# Patient Record
Sex: FEMALE | Race: WHITE | Employment: PART TIME | URBAN - METROPOLITAN AREA
[De-identification: names, ages, dates, MRNs, and addresses within clinical notes are randomized per-mention and may not be internally consistent; named-entity substitution may affect disease eponyms.]

---

## 2019-01-07 PROCEDURE — 87624 HPV HI-RISK TYP POOLED RSLT: CPT | Performed by: OBSTETRICS & GYNECOLOGY

## 2019-01-07 PROCEDURE — 88175 CYTOPATH C/V AUTO FLUID REDO: CPT | Performed by: OBSTETRICS & GYNECOLOGY

## 2019-01-07 PROCEDURE — 87086 URINE CULTURE/COLONY COUNT: CPT | Performed by: OBSTETRICS & GYNECOLOGY

## 2019-01-15 NOTE — PROGRESS NOTES
Jerzy Reynoso Consultation    Dear Dr. Genaro Blancas,    Thank you for requesting ultrasound evaluation and maternal fetal medicine consultation on your patient Carlos Rosa.   As you are aware she is a 41year old female with a Sin (99.8 kg)   LMP 10/15/2018   BMI 35.51 kg/m²   General: alert and oriented,no acute distress  Abdomen: gravid, soft, non-tender  Extremities: non-tender, no edema      OBSTETRIC ULTRASOUND  The patient had a first trimester ultrasound today which I interpr 25 mm x 28 mm. Volume: 9.2 ml.  ____________________________________________________________________________  Comments:  Impression:  1. IUP at 13w2d  2. Normal appearing fetus  3. Normal transvaginal CL and normal uterus.   4.  Left ovary appears normal Women 28years of age or older are more likely to be delivered by .  The  delivery rate in the general obstetric population of the [facility] is almost 30 percent, compared to almost 48 percent in women over age 36 to 39 and almost [de-identified] per and with trisomy 21 is 1: 190.     Invasive Testing  I offered invasive genetic testing (amniocentesis, chorionic villus sampling) after reviewing the diagnostic accuracy of these tests as well as the procedure associated loss rate (1:500 for genetic amnioc association with obesity in the general population, type 2 diabetes mellitus is one of the two most common medical complications of the obese .  The increased risk of type 2 diabetes is primarily related to an exaggerated increase in insulin resistan of neural tube defects increased significantly with maternal weight. The analysis found that overweight and obese pregnant women experienced significantly more stillbirths than normal weight women.        We discussed the current recommendations for Arya Angel setting of  premature rupture of the membranes, 20 to 25 percent result from intraamniotic infection, and 25 to 30 percent are due to spontaneous (unexplained)  labor.                While the ability of obstetric care providers to identify wo risk of  labor. Cervical insufficiency complicates 0.1 to 2 percent of all pregnancies, and is estimated to be responsible for approximately 15 percent of late second trimester losses.                A woman with an uncomplicated p PPROM  · Advanced maternal age, declined aneuploidy screening or invasive genetic testing  · Maternal obesity complicating pregnancy    RECOMMENDATIONS:  · Continue care with Dr. Eddie Davis  · Begin weekly [place]/ 17-OHPC therapy at 16 weeks and continue until

## 2019-01-16 ENCOUNTER — OFFICE VISIT (OUTPATIENT)
Dept: PERINATAL CARE | Facility: HOSPITAL | Age: 39
End: 2019-01-16
Attending: OBSTETRICS & GYNECOLOGY
Payer: COMMERCIAL

## 2019-01-16 VITALS
SYSTOLIC BLOOD PRESSURE: 132 MMHG | BODY MASS INDEX: 35.36 KG/M2 | HEIGHT: 66 IN | HEART RATE: 97 BPM | WEIGHT: 220 LBS | DIASTOLIC BLOOD PRESSURE: 87 MMHG

## 2019-01-16 DIAGNOSIS — O09.521 MULTIGRAVIDA OF ADVANCED MATERNAL AGE IN FIRST TRIMESTER: ICD-10-CM

## 2019-01-16 DIAGNOSIS — Z98.891 H/O: C-SECTION: ICD-10-CM

## 2019-01-16 DIAGNOSIS — O99.211 OBESITY AFFECTING PREGNANCY IN FIRST TRIMESTER: ICD-10-CM

## 2019-01-16 DIAGNOSIS — O09.891 H/O PRETERM DELIVERY, CURRENTLY PREGNANT, FIRST TRIMESTER: ICD-10-CM

## 2019-01-16 PROCEDURE — 99244 OFF/OP CNSLTJ NEW/EST MOD 40: CPT | Performed by: OBSTETRICS & GYNECOLOGY

## 2019-01-16 PROCEDURE — 76801 OB US < 14 WKS SINGLE FETUS: CPT | Performed by: OBSTETRICS & GYNECOLOGY

## 2019-01-16 PROCEDURE — 76817 TRANSVAGINAL US OBSTETRIC: CPT | Performed by: OBSTETRICS & GYNECOLOGY

## 2019-01-16 RX ORDER — CHOLECALCIFEROL (VITAMIN D3) 25 MCG
1 TABLET,CHEWABLE ORAL DAILY
COMMUNITY

## 2019-01-30 NOTE — PROGRESS NOTES
Kayy Fuller  Dear Dr. Kulwinder Huggins,     Thank you for requesting ultrasound evaluation and maternal fetal medicine consultation on your patient Michelle Gardner.   As you are aware she is a 41year old female  with Structures:  Cervix: normal. No funneling. Cervical length: 33.0 mm  Cervical length with fundal pressure: 33 mm.  ____________________________________________________________________________  Comments:  Impression:  1. IUP at 16w2d  2.   Normal cervical with numerous maternal and  risks which were reviewed. See prior [facility] note from 18 for details.     H/o  DELIVERY -   She had SROM at ~ 24 weeks and emergent  at 25+ weeks due to cord prolapse.   Her daughter, Corrie López, is doing we contact me if additional questions or concerns arise.       Cyndi Villatoro M.D.     The majority of the time (>50%) was spent in review of records, consultation and coordination of care. Our discussion is summarized above.  The approximate physician face-to-f

## 2019-02-06 ENCOUNTER — OFFICE VISIT (OUTPATIENT)
Dept: PERINATAL CARE | Facility: HOSPITAL | Age: 39
End: 2019-02-06
Attending: OBSTETRICS & GYNECOLOGY
Payer: COMMERCIAL

## 2019-02-06 VITALS
BODY MASS INDEX: 36 KG/M2 | DIASTOLIC BLOOD PRESSURE: 86 MMHG | SYSTOLIC BLOOD PRESSURE: 128 MMHG | HEART RATE: 102 BPM | WEIGHT: 224 LBS | HEIGHT: 66 IN

## 2019-02-06 DIAGNOSIS — R10.31 RLQ ABDOMINAL PAIN: ICD-10-CM

## 2019-02-06 DIAGNOSIS — O09.892 H/O PRETERM DELIVERY, CURRENTLY PREGNANT, SECOND TRIMESTER: ICD-10-CM

## 2019-02-06 DIAGNOSIS — O09.522 MULTIGRAVIDA OF ADVANCED MATERNAL AGE IN SECOND TRIMESTER: ICD-10-CM

## 2019-02-06 DIAGNOSIS — O99.212 OBESITY AFFECTING PREGNANCY IN SECOND TRIMESTER: ICD-10-CM

## 2019-02-06 DIAGNOSIS — Z87.74 HISTORY OF CONGENITAL HEART DEFECT: ICD-10-CM

## 2019-02-06 DIAGNOSIS — O09.891 H/O PRETERM DELIVERY, CURRENTLY PREGNANT, FIRST TRIMESTER: ICD-10-CM

## 2019-02-06 DIAGNOSIS — Z98.891 H/O: C-SECTION: ICD-10-CM

## 2019-02-06 PROCEDURE — 99214 OFFICE O/P EST MOD 30 MIN: CPT | Performed by: OBSTETRICS & GYNECOLOGY

## 2019-02-06 PROCEDURE — 76817 TRANSVAGINAL US OBSTETRIC: CPT | Performed by: OBSTETRICS & GYNECOLOGY

## 2019-02-06 PROCEDURE — 76815 OB US LIMITED FETUS(S): CPT | Performed by: OBSTETRICS & GYNECOLOGY

## 2019-02-06 NOTE — PROGRESS NOTES
Pt here for baseline cx length accompanied by her   States has had brown spotting when wipe.  OB aware

## 2019-02-20 ENCOUNTER — OFFICE VISIT (OUTPATIENT)
Dept: PERINATAL CARE | Facility: HOSPITAL | Age: 39
End: 2019-02-20
Attending: OBSTETRICS & GYNECOLOGY
Payer: COMMERCIAL

## 2019-02-20 VITALS
HEART RATE: 92 BPM | WEIGHT: 225 LBS | HEIGHT: 66 IN | SYSTOLIC BLOOD PRESSURE: 129 MMHG | BODY MASS INDEX: 36.16 KG/M2 | DIASTOLIC BLOOD PRESSURE: 84 MMHG

## 2019-02-20 DIAGNOSIS — O24.419 GESTATIONAL DIABETES MELLITUS (GDM) IN SECOND TRIMESTER, GESTATIONAL DIABETES METHOD OF CONTROL UNSPECIFIED: ICD-10-CM

## 2019-02-20 DIAGNOSIS — O09.891 H/O PRETERM DELIVERY, CURRENTLY PREGNANT, FIRST TRIMESTER: ICD-10-CM

## 2019-02-20 PROCEDURE — 99214 OFFICE O/P EST MOD 30 MIN: CPT | Performed by: OBSTETRICS & GYNECOLOGY

## 2019-02-20 PROCEDURE — 76817 TRANSVAGINAL US OBSTETRIC: CPT | Performed by: OBSTETRICS & GYNECOLOGY

## 2019-02-20 NOTE — PROGRESS NOTES
Indication: obesity. Maternal age (41 years). ____________________________________________________________________________  History: Age: 41 years. Maternal age at [facility]: 41 years. : 2 Para: 1.  Last menstrual period: 10/15/2018.  _________________ AGE    Background  I reviewed with the patient that pregnancies in women of advanced maternal age (28 or older at delivery) are associated with elevated risks.  Specifically, there is a higher rate of:  · Fetal malformations  · Preeclampsia  · Gestational d syndrome ranging from 2 to 13%. We discussed the recommended plan of care based on her  risk factors. Faisal Blackmon and her significant other, Miryam Monte, had their questions answered to their satisfaction.       IMPRESSION:  · IUP at 18w2d  · [facility]

## 2019-03-06 NOTE — PROGRESS NOTES
Vinicio Griffin  Dear Dr. Jone Gauthier,     Thank you for requesting ultrasound evaluation and maternal fetal medicine consultation on your patient Cecily Blas.  As you are aware she is a 41year old female  with 1.  ____________________________________________________________________________  Dating:  LMP: 10/15/2018 EDC: 07/22/2019 GA by LMP: 21w2d  Current Scan on: 03/13/2019 EDC: 07/25/2019 GA by current scan: 20w6d  The calculation of the gestational age by cu and chromosomal abnormalities. ____________________________________________________________________________  Genetic Sonogram:  measured FL: 35.5 mm exp. FL: 34.1 mm  measured HUM: 33.5 mm exp. HUM: 32.9 mm  ratio FL/exp. FL: 1.04  ratio HUM/exp. HUM: 1.0 reduce recurrent PTD were reviewed.   She is starting on Leon as soon as her insurance approves it.     Congenital Heart Defects:  Congenital heart defects occur in approximately 8 per 1000 livebirths. Marimarmarcos Lockwood may occur as one component of a recognized gene recommended four times daily assessments (fasting and two-hour post-prandial) is excellent. Her overall glucose control is good except for mildly elevated FBA.     The patient is presently on diet therapy; her compliance with her diabetic diet regimen was classical  · H/o PPROM  · Advanced maternal age, declined aneuploidy screening or invasive genetic testing  · Maternal obesity complicating pregnancy  · Maternal h/o ASD/ VSD  · Gestational diabetes, A1     RECOMMENDATIONS:  · Continue care with Dr. Shea

## 2019-03-13 ENCOUNTER — OFFICE VISIT (OUTPATIENT)
Dept: PERINATAL CARE | Facility: HOSPITAL | Age: 39
End: 2019-03-13
Attending: OBSTETRICS & GYNECOLOGY
Payer: COMMERCIAL

## 2019-03-13 VITALS
BODY MASS INDEX: 36.16 KG/M2 | HEIGHT: 66 IN | HEART RATE: 94 BPM | DIASTOLIC BLOOD PRESSURE: 84 MMHG | WEIGHT: 225 LBS | SYSTOLIC BLOOD PRESSURE: 126 MMHG

## 2019-03-13 DIAGNOSIS — O09.891 H/O PRETERM DELIVERY, CURRENTLY PREGNANT, FIRST TRIMESTER: ICD-10-CM

## 2019-03-13 DIAGNOSIS — O99.212 OBESITY AFFECTING PREGNANCY IN SECOND TRIMESTER: ICD-10-CM

## 2019-03-13 DIAGNOSIS — Z87.74: ICD-10-CM

## 2019-03-13 DIAGNOSIS — O24.410 DIET CONTROLLED GESTATIONAL DIABETES MELLITUS (GDM) IN SECOND TRIMESTER: ICD-10-CM

## 2019-03-13 PROBLEM — O24.419 GESTATIONAL DIABETES: Status: ACTIVE | Noted: 2019-03-13

## 2019-03-13 PROBLEM — O99.210 OBESITY COMPLICATING PREGNANCY: Status: ACTIVE | Noted: 2019-03-13

## 2019-03-13 PROCEDURE — 99215 OFFICE O/P EST HI 40 MIN: CPT | Performed by: OBSTETRICS & GYNECOLOGY

## 2019-03-13 PROCEDURE — 76817 TRANSVAGINAL US OBSTETRIC: CPT | Performed by: OBSTETRICS & GYNECOLOGY

## 2019-03-13 PROCEDURE — 76811 OB US DETAILED SNGL FETUS: CPT | Performed by: OBSTETRICS & GYNECOLOGY

## 2019-03-29 NOTE — PROGRESS NOTES
Outpatient Maternal-Fetal Medicine Follow-Up     Dear Dr. Shea,     Thank you for requesting ultrasound evaluation and maternal fetal medicine consultation on your patient Stacia Rosa.  As you are aware she is a 41year old female  with 10/15/2018.  ____________________________________________________________________________  Echocardiography:    Image quality:  Limited  Situs:  Normal  Position of stomach:  Left sided  Heart size:  Normal  Position of apex:  normal  Systemic veins:  Norm pregnancy. We discussed risks for fetal growth abnormalities, hypertensive complications, maternal hospitalizations,  mortality and  delivery. We discussed the plan of care and the rationale for the increased surveillance.   We reviewed t later in life. The importance of good glycemic control and avoidance of prolonged hypoglycemia and hyperglycemia was addressed.      GESTATIONAL DIABETES - follow-up  ADA diet recommendations were reviewed and the patient's dietary compliance is good.   Her participate in the care of your patient. Meenakshi Murillo do not hesitate to contact me if additional questions or concerns arise.       Laila Quintanilla M.D.     The majority of the time (>50%) was spent in review of records, consultation and coordination of care. Jolynn Barnes

## 2019-04-03 ENCOUNTER — OFFICE VISIT (OUTPATIENT)
Dept: PERINATAL CARE | Facility: HOSPITAL | Age: 39
End: 2019-04-03
Attending: OBSTETRICS & GYNECOLOGY
Payer: COMMERCIAL

## 2019-04-03 VITALS
HEART RATE: 97 BPM | SYSTOLIC BLOOD PRESSURE: 126 MMHG | DIASTOLIC BLOOD PRESSURE: 83 MMHG | WEIGHT: 228 LBS | BODY MASS INDEX: 37 KG/M2

## 2019-04-03 DIAGNOSIS — O09.891 H/O PRETERM DELIVERY, CURRENTLY PREGNANT, FIRST TRIMESTER: ICD-10-CM

## 2019-04-03 DIAGNOSIS — O99.213 OBESITY AFFECTING PREGNANCY IN THIRD TRIMESTER: ICD-10-CM

## 2019-04-03 DIAGNOSIS — O24.414 INSULIN CONTROLLED GESTATIONAL DIABETES MELLITUS (GDM) IN THIRD TRIMESTER: ICD-10-CM

## 2019-04-03 DIAGNOSIS — Z87.74: ICD-10-CM

## 2019-04-03 PROCEDURE — 76827 ECHO EXAM OF FETAL HEART: CPT | Performed by: OBSTETRICS & GYNECOLOGY

## 2019-04-03 PROCEDURE — 99215 OFFICE O/P EST HI 40 MIN: CPT | Performed by: OBSTETRICS & GYNECOLOGY

## 2019-04-03 PROCEDURE — 93325 DOPPLER ECHO COLOR FLOW MAPG: CPT | Performed by: OBSTETRICS & GYNECOLOGY

## 2019-04-03 PROCEDURE — 76825 ECHO EXAM OF FETAL HEART: CPT | Performed by: OBSTETRICS & GYNECOLOGY

## 2019-04-24 NOTE — PROGRESS NOTES
Outpatient Maternal-Fetal Medicine Follow-Up     Dear Dr. Shea,     Thank you for requesting ultrasound evaluation and maternal fetal medicine consultation on your patient Stacia Rosa.  As you are aware she is a 41year old female  with gestation.   Biometry:  BPD 69.3 mm 6th% 27w6d (27w1d to 28w4d)  .7 mm <5th% 27w2d (25w2d to 29w3d)  .3 mm 26th% 28w5d (28w0d to 29w3d)  FL 53.5 mm 14th% 28w3d (26w2d to 30w3d)  OFD 89.7 mm <5th% 27w1d  TCD 31.1 mm 18th% 27w6d  HUM 49.2 mm 35th% during pregnancy is associated with numerous maternal and  risks which were reviewed.  See prior MFM note from 18 for details.     H/o  DELIVERY -   She had SROM at ~ 24 weeks and emergent  at 25+ weeks due to cord prolapse.    recommended plan of care based on her  risk factors.  Stacia and her significant other, Jonathan Dover, had their questions answered to their satisfaction.        IMPRESSION:  · IUP at 29w2d  · Normal fetal growth and anatomy  · H/o , probable cl

## 2019-04-30 PROCEDURE — 87389 HIV-1 AG W/HIV-1&-2 AB AG IA: CPT | Performed by: OBSTETRICS & GYNECOLOGY

## 2019-05-08 ENCOUNTER — OFFICE VISIT (OUTPATIENT)
Dept: PERINATAL CARE | Facility: HOSPITAL | Age: 39
End: 2019-05-08
Attending: OBSTETRICS & GYNECOLOGY
Payer: COMMERCIAL

## 2019-05-08 VITALS
HEART RATE: 86 BPM | BODY MASS INDEX: 36 KG/M2 | WEIGHT: 226 LBS | SYSTOLIC BLOOD PRESSURE: 123 MMHG | DIASTOLIC BLOOD PRESSURE: 79 MMHG

## 2019-05-08 DIAGNOSIS — O99.213 OBESITY AFFECTING PREGNANCY IN THIRD TRIMESTER: ICD-10-CM

## 2019-05-08 DIAGNOSIS — O24.414 INSULIN CONTROLLED GESTATIONAL DIABETES MELLITUS (GDM) IN THIRD TRIMESTER: ICD-10-CM

## 2019-05-08 DIAGNOSIS — O09.891 H/O PRETERM DELIVERY, CURRENTLY PREGNANT, FIRST TRIMESTER: ICD-10-CM

## 2019-05-08 DIAGNOSIS — Z87.74: ICD-10-CM

## 2019-05-08 PROCEDURE — 99215 OFFICE O/P EST HI 40 MIN: CPT | Performed by: OBSTETRICS & GYNECOLOGY

## 2019-05-08 PROCEDURE — 76816 OB US FOLLOW-UP PER FETUS: CPT | Performed by: OBSTETRICS & GYNECOLOGY

## 2019-05-08 NOTE — PROGRESS NOTES
Pt here for growth ultrasound  Pt states +FM  No complaints  17 P given per pt request. Rosa Elena Michelle no complaints

## 2019-05-29 NOTE — PROGRESS NOTES
Outpatient Maternal-Fetal Medicine Follow-Up     Dear Dr. Shea,     Thank you for requesting ultrasound evaluation and maternal fetal medicine consultation on your patient Stacia Rosa.  As you are aware she is a 41year old female  with gestation.   Biometry:  BPD 79.9 mm 14th% 32w1d (31w0d to 33w1d)  .3 mm <5th% 32w3d (29w3d to 35w2d)  .5 mm 82nd% 34w3d (33w3d to 35w3d)  FL 62.4 mm 16th% 32w2d (29w2d to 35w2d)  .5 mm 18th% 31w3d  HUM 54.2 mm 22nd% 31w4d  VENTRp 3.9 mm n discussed the plan of care and the rationale for the increased surveillance.  We reviewed the signs and symptoms of preeclampsia,  labor and monitoring fetal movement.  We discussed reasons for her to call her physician.     OBESITY:  BMI [address] is:  Levemir 0 units qAM, 20 units qHS     Medical Regimen Recommendation:  Levemir 0 units qAM, 20 units qHS     Continued medication use and capillary blood glucose assessments were reviewed as well as recommendations for serial growth ultrasounds and an

## 2019-06-05 ENCOUNTER — OFFICE VISIT (OUTPATIENT)
Dept: PERINATAL CARE | Facility: HOSPITAL | Age: 39
End: 2019-06-05
Attending: OBSTETRICS & GYNECOLOGY
Payer: COMMERCIAL

## 2019-06-05 VITALS
WEIGHT: 229 LBS | SYSTOLIC BLOOD PRESSURE: 122 MMHG | HEART RATE: 86 BPM | BODY MASS INDEX: 37 KG/M2 | DIASTOLIC BLOOD PRESSURE: 80 MMHG

## 2019-06-05 DIAGNOSIS — O99.213 OBESITY AFFECTING PREGNANCY IN THIRD TRIMESTER: ICD-10-CM

## 2019-06-05 DIAGNOSIS — O09.891 H/O PRETERM DELIVERY, CURRENTLY PREGNANT, FIRST TRIMESTER: ICD-10-CM

## 2019-06-05 DIAGNOSIS — O24.414 INSULIN CONTROLLED GESTATIONAL DIABETES MELLITUS (GDM) IN THIRD TRIMESTER: ICD-10-CM

## 2019-06-05 PROCEDURE — 76816 OB US FOLLOW-UP PER FETUS: CPT | Performed by: OBSTETRICS & GYNECOLOGY

## 2019-06-05 PROCEDURE — 99214 OFFICE O/P EST MOD 30 MIN: CPT | Performed by: OBSTETRICS & GYNECOLOGY

## 2019-06-05 PROCEDURE — 76819 FETAL BIOPHYS PROFIL W/O NST: CPT | Performed by: OBSTETRICS & GYNECOLOGY

## 2019-06-24 NOTE — PROGRESS NOTES
Outpatient Maternal-Fetal Medicine Follow-Up     Dear Dr. Shea,     Thank you for requesting ultrasound evaluation and maternal fetal medicine consultation on your patient Stacia Rosa.  As you are aware she is a 41year old female  with gestation.   Biometry:  BPD 83.7 mm <5th% 33w5d (32w5d to 34w5d)  .6 mm 7th% 35w2d (32w2d to 38w2d)  .1 mm 60th% 36w2d (35w1d to 37w2d)  FL 68.2 mm 17th% 35w0d (32w1d to 38w0d)  .0 mm 33rd% 34w5d  HUM 58.2 mm 16th% 34w1d  VENTRp 7.4 mm n/ hypertensive complications, maternal hospitalizations,  mortality and  delivery.  We discussed the plan of care and the rationale for the increased surveillance.  We reviewed the signs and symptoms of preeclampsia,  labor and monito regimen was reviewed and it is excellent.    Her current insulin regimen is:  Levemir 0 units qAM, 20 units qHS     Medical Regimen Recommendation: No change  Levemir 0 units qAM, 20 units qHS     Continued medication use and capillary blood glucose assessm

## 2019-06-26 ENCOUNTER — OFFICE VISIT (OUTPATIENT)
Dept: PERINATAL CARE | Facility: HOSPITAL | Age: 39
End: 2019-06-26
Attending: OBSTETRICS & GYNECOLOGY
Payer: COMMERCIAL

## 2019-06-26 VITALS
SYSTOLIC BLOOD PRESSURE: 121 MMHG | DIASTOLIC BLOOD PRESSURE: 83 MMHG | HEART RATE: 96 BPM | WEIGHT: 230 LBS | BODY MASS INDEX: 37 KG/M2

## 2019-06-26 DIAGNOSIS — O24.414 INSULIN CONTROLLED GESTATIONAL DIABETES MELLITUS IN THIRD TRIMESTER: ICD-10-CM

## 2019-06-26 DIAGNOSIS — Z87.74: ICD-10-CM

## 2019-06-26 DIAGNOSIS — O99.213 OBESITY AFFECTING PREGNANCY IN THIRD TRIMESTER: ICD-10-CM

## 2019-06-26 DIAGNOSIS — O09.891 H/O PRETERM DELIVERY, CURRENTLY PREGNANT, FIRST TRIMESTER: ICD-10-CM

## 2019-06-26 PROCEDURE — 76816 OB US FOLLOW-UP PER FETUS: CPT | Performed by: OBSTETRICS & GYNECOLOGY

## 2019-06-26 PROCEDURE — 76819 FETAL BIOPHYS PROFIL W/O NST: CPT | Performed by: OBSTETRICS & GYNECOLOGY

## 2019-06-26 PROCEDURE — 99213 OFFICE O/P EST LOW 20 MIN: CPT | Performed by: OBSTETRICS & GYNECOLOGY

## 2019-07-02 ENCOUNTER — TELEPHONE (OUTPATIENT)
Dept: OBGYN UNIT | Facility: HOSPITAL | Age: 39
End: 2019-07-02

## 2019-07-05 ENCOUNTER — TELEPHONE (OUTPATIENT)
Dept: OBGYN UNIT | Facility: HOSPITAL | Age: 39
End: 2019-07-05

## 2019-07-08 ENCOUNTER — HOSPITAL ENCOUNTER (INPATIENT)
Facility: HOSPITAL | Age: 39
LOS: 3 days | Discharge: HOME | End: 2019-07-11
Attending: OBSTETRICS & GYNECOLOGY | Admitting: OBSTETRICS & GYNECOLOGY
Payer: COMMERCIAL

## 2019-07-08 ENCOUNTER — ANESTHESIA EVENT (OUTPATIENT)
Dept: OBGYN UNIT | Facility: HOSPITAL | Age: 39
End: 2019-07-08

## 2019-07-08 ENCOUNTER — ANESTHESIA (OUTPATIENT)
Dept: OBGYN UNIT | Facility: HOSPITAL | Age: 39
End: 2019-07-08

## 2019-07-08 PROBLEM — Z34.90 PREGNANCY: Status: ACTIVE | Noted: 2019-07-08

## 2019-07-08 LAB
ANTIBODY SCREEN: NEGATIVE
BASOPHILS # BLD AUTO: 0.03 X10(3) UL (ref 0–0.2)
BASOPHILS NFR BLD AUTO: 0.3 %
DEPRECATED RDW RBC AUTO: 48 FL (ref 35.1–46.3)
EOSINOPHIL # BLD AUTO: 0.12 X10(3) UL (ref 0–0.7)
EOSINOPHIL NFR BLD AUTO: 1.1 %
ERYTHROCYTE [DISTWIDTH] IN BLOOD BY AUTOMATED COUNT: 15.8 % (ref 11–15)
GLUCOSE BLD-MCNC: 79 MG/DL (ref 70–99)
GLUCOSE BLD-MCNC: 87 MG/DL (ref 70–99)
HCT VFR BLD AUTO: 34.5 % (ref 35–48)
HGB BLD-MCNC: 11 G/DL (ref 12–16)
IMM GRANULOCYTES # BLD AUTO: 0.04 X10(3) UL (ref 0–1)
IMM GRANULOCYTES NFR BLD: 0.4 %
LYMPHOCYTES # BLD AUTO: 2.67 X10(3) UL (ref 1–4)
LYMPHOCYTES NFR BLD AUTO: 24 %
MCH RBC QN AUTO: 27 PG (ref 26–34)
MCHC RBC AUTO-ENTMCNC: 31.9 G/DL (ref 31–37)
MCV RBC AUTO: 84.6 FL (ref 80–100)
MONOCYTES # BLD AUTO: 0.67 X10(3) UL (ref 0.1–1)
MONOCYTES NFR BLD AUTO: 6 %
NEUTROPHILS # BLD AUTO: 7.61 X10 (3) UL (ref 1.5–7.7)
NEUTROPHILS # BLD AUTO: 7.61 X10(3) UL (ref 1.5–7.7)
NEUTROPHILS NFR BLD AUTO: 68.2 %
PLATELET # BLD AUTO: 315 10(3)UL (ref 150–450)
RBC # BLD AUTO: 4.08 X10(6)UL (ref 3.8–5.3)
RH BLOOD TYPE: POSITIVE
T PALLIDUM AB SER QL IA: NONREACTIVE
WBC # BLD AUTO: 11.1 X10(3) UL (ref 4–11)

## 2019-07-08 PROCEDURE — 86850 RBC ANTIBODY SCREEN: CPT | Performed by: OBSTETRICS & GYNECOLOGY

## 2019-07-08 PROCEDURE — 0UB00ZX EXCISION OF RIGHT OVARY, OPEN APPROACH, DIAGNOSTIC: ICD-10-PCS | Performed by: OBSTETRICS & GYNECOLOGY

## 2019-07-08 PROCEDURE — 86900 BLOOD TYPING SEROLOGIC ABO: CPT | Performed by: OBSTETRICS & GYNECOLOGY

## 2019-07-08 PROCEDURE — 85025 COMPLETE CBC W/AUTO DIFF WBC: CPT | Performed by: OBSTETRICS & GYNECOLOGY

## 2019-07-08 PROCEDURE — 88305 TISSUE EXAM BY PATHOLOGIST: CPT | Performed by: OBSTETRICS & GYNECOLOGY

## 2019-07-08 PROCEDURE — 86780 TREPONEMA PALLIDUM: CPT | Performed by: OBSTETRICS & GYNECOLOGY

## 2019-07-08 PROCEDURE — 0JB80ZZ EXCISION OF ABDOMEN SUBCUTANEOUS TISSUE AND FASCIA, OPEN APPROACH: ICD-10-PCS | Performed by: OBSTETRICS & GYNECOLOGY

## 2019-07-08 PROCEDURE — 86901 BLOOD TYPING SEROLOGIC RH(D): CPT | Performed by: OBSTETRICS & GYNECOLOGY

## 2019-07-08 PROCEDURE — 82962 GLUCOSE BLOOD TEST: CPT

## 2019-07-08 RX ORDER — TRISODIUM CITRATE DIHYDRATE AND CITRIC ACID MONOHYDRATE 500; 334 MG/5ML; MG/5ML
30 SOLUTION ORAL ONCE
Status: COMPLETED | OUTPATIENT
Start: 2019-07-08 | End: 2019-07-08

## 2019-07-08 RX ORDER — SODIUM CHLORIDE, SODIUM LACTATE, POTASSIUM CHLORIDE, CALCIUM CHLORIDE 600; 310; 30; 20 MG/100ML; MG/100ML; MG/100ML; MG/100ML
INJECTION, SOLUTION INTRAVENOUS CONTINUOUS
Status: DISCONTINUED | OUTPATIENT
Start: 2019-07-08 | End: 2019-07-08

## 2019-07-08 RX ORDER — BISACODYL 10 MG
10 SUPPOSITORY, RECTAL RECTAL
Status: DISCONTINUED | OUTPATIENT
Start: 2019-07-08 | End: 2019-07-11

## 2019-07-08 RX ORDER — DIPHENHYDRAMINE HCL 25 MG
25 CAPSULE ORAL EVERY 4 HOURS PRN
Status: DISCONTINUED | OUTPATIENT
Start: 2019-07-08 | End: 2019-07-11

## 2019-07-08 RX ORDER — ONDANSETRON 2 MG/ML
4 INJECTION INTRAMUSCULAR; INTRAVENOUS EVERY 6 HOURS PRN
Status: DISCONTINUED | OUTPATIENT
Start: 2019-07-08 | End: 2019-07-11

## 2019-07-08 RX ORDER — SODIUM CHLORIDE, SODIUM LACTATE, POTASSIUM CHLORIDE, CALCIUM CHLORIDE 600; 310; 30; 20 MG/100ML; MG/100ML; MG/100ML; MG/100ML
INJECTION, SOLUTION INTRAVENOUS CONTINUOUS
Status: DISCONTINUED | OUTPATIENT
Start: 2019-07-08 | End: 2019-07-08 | Stop reason: HOSPADM

## 2019-07-08 RX ORDER — DEXTROSE MONOHYDRATE 25 G/50ML
50 INJECTION, SOLUTION INTRAVENOUS
Status: DISCONTINUED | OUTPATIENT
Start: 2019-07-08 | End: 2019-07-08

## 2019-07-08 RX ORDER — MORPHINE SULFATE 0.5 MG/ML
0.2 INJECTION, SOLUTION EPIDURAL; INTRATHECAL; INTRAVENOUS ONCE
Status: DISCONTINUED | OUTPATIENT
Start: 2019-07-08 | End: 2019-07-08

## 2019-07-08 RX ORDER — DEXTROSE, SODIUM CHLORIDE, SODIUM LACTATE, POTASSIUM CHLORIDE, AND CALCIUM CHLORIDE 5; .6; .31; .03; .02 G/100ML; G/100ML; G/100ML; G/100ML; G/100ML
INJECTION, SOLUTION INTRAVENOUS CONTINUOUS
Status: DISCONTINUED | OUTPATIENT
Start: 2019-07-08 | End: 2019-07-11

## 2019-07-08 RX ORDER — SIMETHICONE 80 MG
80 TABLET,CHEWABLE ORAL 3 TIMES DAILY PRN
Status: DISCONTINUED | OUTPATIENT
Start: 2019-07-08 | End: 2019-07-11

## 2019-07-08 RX ORDER — IBUPROFEN 600 MG/1
600 TABLET ORAL EVERY 6 HOURS SCHEDULED
Status: DISCONTINUED | OUTPATIENT
Start: 2019-07-09 | End: 2019-07-11

## 2019-07-08 RX ORDER — DIPHENHYDRAMINE HYDROCHLORIDE 50 MG/ML
12.5 INJECTION INTRAMUSCULAR; INTRAVENOUS EVERY 4 HOURS PRN
Status: DISCONTINUED | OUTPATIENT
Start: 2019-07-08 | End: 2019-07-11

## 2019-07-08 RX ORDER — CEFAZOLIN SODIUM/WATER 2 G/20 ML
2 SYRINGE (ML) INTRAVENOUS ONCE
Status: COMPLETED | OUTPATIENT
Start: 2019-07-08 | End: 2019-07-08

## 2019-07-08 RX ORDER — CHOLECALCIFEROL (VITAMIN D3) 25 MCG
1 TABLET,CHEWABLE ORAL DAILY
Status: DISCONTINUED | OUTPATIENT
Start: 2019-07-09 | End: 2019-07-11

## 2019-07-08 RX ORDER — KETOROLAC TROMETHAMINE 30 MG/ML
30 INJECTION, SOLUTION INTRAMUSCULAR; INTRAVENOUS EVERY 6 HOURS PRN
Status: DISPENSED | OUTPATIENT
Start: 2019-07-08 | End: 2019-07-09

## 2019-07-08 RX ORDER — HYDROCODONE BITARTRATE AND ACETAMINOPHEN 5; 325 MG/1; MG/1
1 TABLET ORAL EVERY 4 HOURS PRN
Status: DISCONTINUED | OUTPATIENT
Start: 2019-07-08 | End: 2019-07-11

## 2019-07-08 RX ORDER — ZOLPIDEM TARTRATE 5 MG/1
5 TABLET ORAL NIGHTLY PRN
Status: DISCONTINUED | OUTPATIENT
Start: 2019-07-08 | End: 2019-07-11

## 2019-07-08 RX ORDER — ONDANSETRON 2 MG/ML
4 INJECTION INTRAMUSCULAR; INTRAVENOUS ONCE AS NEEDED
Status: DISCONTINUED | OUTPATIENT
Start: 2019-07-08 | End: 2019-07-08

## 2019-07-08 RX ORDER — HYDROCODONE BITARTRATE AND ACETAMINOPHEN 10; 325 MG/1; MG/1
1 TABLET ORAL EVERY 4 HOURS PRN
Status: DISCONTINUED | OUTPATIENT
Start: 2019-07-08 | End: 2019-07-11

## 2019-07-08 RX ORDER — DIPHENHYDRAMINE HYDROCHLORIDE 50 MG/ML
25 INJECTION INTRAMUSCULAR; INTRAVENOUS ONCE AS NEEDED
Status: DISCONTINUED | OUTPATIENT
Start: 2019-07-08 | End: 2019-07-08

## 2019-07-08 RX ORDER — MORPHINE SULFATE 4 MG/ML
2 INJECTION, SOLUTION INTRAMUSCULAR; INTRAVENOUS EVERY 2 HOUR PRN
Status: ACTIVE | OUTPATIENT
Start: 2019-07-08 | End: 2019-07-09

## 2019-07-08 RX ORDER — TRISODIUM CITRATE DIHYDRATE AND CITRIC ACID MONOHYDRATE 500; 334 MG/5ML; MG/5ML
30 SOLUTION ORAL ONCE
Status: DISCONTINUED | OUTPATIENT
Start: 2019-07-08 | End: 2019-07-08 | Stop reason: HOSPADM

## 2019-07-08 RX ORDER — KETOROLAC TROMETHAMINE 30 MG/ML
INJECTION, SOLUTION INTRAMUSCULAR; INTRAVENOUS
Status: DISPENSED
Start: 2019-07-08 | End: 2019-07-09

## 2019-07-08 RX ORDER — NALOXONE HYDROCHLORIDE 0.4 MG/ML
0.08 INJECTION, SOLUTION INTRAMUSCULAR; INTRAVENOUS; SUBCUTANEOUS
Status: ACTIVE | OUTPATIENT
Start: 2019-07-08 | End: 2019-07-09

## 2019-07-08 RX ORDER — NALBUPHINE HCL 10 MG/ML
2.5 AMPUL (ML) INJECTION
Status: DISCONTINUED | OUTPATIENT
Start: 2019-07-08 | End: 2019-07-08

## 2019-07-08 RX ORDER — METOCLOPRAMIDE HYDROCHLORIDE 5 MG/ML
10 INJECTION INTRAMUSCULAR; INTRAVENOUS EVERY 6 HOURS PRN
Status: DISCONTINUED | OUTPATIENT
Start: 2019-07-08 | End: 2019-07-11

## 2019-07-08 RX ORDER — DOCUSATE SODIUM 100 MG/1
100 CAPSULE, LIQUID FILLED ORAL
Status: DISCONTINUED | OUTPATIENT
Start: 2019-07-09 | End: 2019-07-11

## 2019-07-08 RX ORDER — KETOROLAC TROMETHAMINE 30 MG/ML
30 INJECTION, SOLUTION INTRAMUSCULAR; INTRAVENOUS ONCE AS NEEDED
Status: DISCONTINUED | OUTPATIENT
Start: 2019-07-08 | End: 2019-07-08

## 2019-07-08 RX ORDER — NALBUPHINE HCL 10 MG/ML
2.5 AMPUL (ML) INJECTION EVERY 4 HOURS PRN
Status: DISCONTINUED | OUTPATIENT
Start: 2019-07-08 | End: 2019-07-11

## 2019-07-08 RX ORDER — MORPHINE SULFATE 4 MG/ML
2 INJECTION, SOLUTION INTRAMUSCULAR; INTRAVENOUS EVERY 5 MIN PRN
Status: DISCONTINUED | OUTPATIENT
Start: 2019-07-08 | End: 2019-07-08

## 2019-07-08 NOTE — PROGRESS NOTES
Pt is a 41year old female admitted to TRG3/TRG3-A. Patient presents with:  Scheduled      Pt is  38w0d intra-uterine pregnancy. History obtained, consents signed. Oriented to room, staff, and plan of care.

## 2019-07-08 NOTE — BRIEF OP NOTE
Pre-Operative Diagnosis: 41year old  @ 45 weeks for elective repeat csection / GDM on insulin     Post-Operative Diagnosis: same , endometriosis within subQ, right ovarian mass     Procedure Performed:   Procedure(s):  Repeat LTCS  Ovarian biopsy

## 2019-07-08 NOTE — ANESTHESIA PREPROCEDURE EVALUATION
PRE-OP EVALUATION    Patient Name: Minna Gonzalez    Pre-op Diagnosis: 41year old  @ 45 weeks for elective repeat csection / GDM on insulin    Procedure(s):      Surgeon(s) and Role:     * Yvette Ley MD - Primary    Pre-op vitals re fluid leaking followed by prolapsed cord   • HERNIA VENTRAL REPAIR N/A 9/6/2013    Performed by Oxana Dorantes MD at [facility] MAIN OR   • OTHER SURGICAL HISTORY  1983    ASD repair     Social History    Tobacco Use      Smoking status: Former Smoker

## 2019-07-08 NOTE — ANESTHESIA POSTPROCEDURE EVALUATION
[id number] [facility] [address] Patient Status:  Inpatient   Age/Gender 41year old female MRN [id number]   Location [address] [place] Attending Coco Oneill MD   Hosp Day # 0 PCP Yuliet Castaneda MD       Anesthesia Post-o

## 2019-07-08 NOTE — PROGRESS NOTES
ADMISSION NOTE  Patient admitted to room in stable condition via: cart    Oriented to room, Safety precautions initiated. Bed in low position, call light in reach.  Report received and ID Bands checked & verified with:Beatris ARIAS  Plan of care and safety instr

## 2019-07-08 NOTE — PROGRESS NOTES
Patient transferred to mother/baby room 2216 per cart in stable condition with baby and personal belongings. Accompanied by  and staff. Report given to mother/baby RN.

## 2019-07-08 NOTE — OPERATIVE REPORT
[facility]   Section - Operative Note    Celeste Rosa Patient Status:  Inpatient    1980 MRN [id number]   Location [address] [facility] Attending Arielle Carreon MD   Hosp Day # 0 PCP Conchita Neves MD complication. The infant was vigorously crying. The cord was clamped and cut at 30 sec. Cord blood was obtained. The infant was shown to the parents and placed in the radiant warmer. The placenta was delivered intact with a 3 vessel cord.   Uterus, tube

## 2019-07-08 NOTE — PLAN OF CARE
Problem: Patient/Family Goals  Goal: Patient/Family Long Term Goal  Description  Patient's Long Term Goal:     Interventions:  - Uncomplicated  section    Interventions:    VS per protocol  EFM per protocol  I&O  Preciado catheter  Abd prep with cli appropriate  Outcome: Progressing     Problem: ANXIETY  Goal: Will report anxiety at manageable levels  Description  INTERVENTIONS:  - Administer medication as ordered  - Teach and rehearse alternative coping skills  - Provide emotional support with 1:1 in

## 2019-07-08 NOTE — H&P
[facility] [address]  [place] Patient Status:  Inpatient    1980 MRN [id number]   Location [address] [facility] Attending Mattie Figueroa MD   Hosp Day # 0 PCP Lois Gamez MD     Subjective:   3

## 2019-07-09 LAB
BASOPHILS # BLD AUTO: 0.04 X10(3) UL (ref 0–0.2)
BASOPHILS NFR BLD AUTO: 0.4 %
DEPRECATED RDW RBC AUTO: 50.1 FL (ref 35.1–46.3)
EOSINOPHIL # BLD AUTO: 0.11 X10(3) UL (ref 0–0.7)
EOSINOPHIL NFR BLD AUTO: 1 %
ERYTHROCYTE [DISTWIDTH] IN BLOOD BY AUTOMATED COUNT: 15.8 % (ref 11–15)
GLUCOSE BLD-MCNC: 93 MG/DL (ref 70–99)
HCT VFR BLD AUTO: 32.4 % (ref 35–48)
HGB BLD-MCNC: 10.1 G/DL (ref 12–16)
IMM GRANULOCYTES # BLD AUTO: 0.03 X10(3) UL (ref 0–1)
IMM GRANULOCYTES NFR BLD: 0.3 %
LYMPHOCYTES # BLD AUTO: 2.96 X10(3) UL (ref 1–4)
LYMPHOCYTES NFR BLD AUTO: 27.8 %
MCH RBC QN AUTO: 27.1 PG (ref 26–34)
MCHC RBC AUTO-ENTMCNC: 31.2 G/DL (ref 31–37)
MCV RBC AUTO: 86.9 FL (ref 80–100)
MONOCYTES # BLD AUTO: 0.55 X10(3) UL (ref 0.1–1)
MONOCYTES NFR BLD AUTO: 5.2 %
NEUTROPHILS # BLD AUTO: 6.96 X10 (3) UL (ref 1.5–7.7)
NEUTROPHILS # BLD AUTO: 6.96 X10(3) UL (ref 1.5–7.7)
NEUTROPHILS NFR BLD AUTO: 65.3 %
PLATELET # BLD AUTO: 252 10(3)UL (ref 150–450)
RBC # BLD AUTO: 3.73 X10(6)UL (ref 3.8–5.3)
WBC # BLD AUTO: 10.7 X10(3) UL (ref 4–11)

## 2019-07-09 PROCEDURE — 82962 GLUCOSE BLOOD TEST: CPT

## 2019-07-09 PROCEDURE — 85025 COMPLETE CBC W/AUTO DIFF WBC: CPT | Performed by: OBSTETRICS & GYNECOLOGY

## 2019-07-09 NOTE — PLAN OF CARE
Problem: POSTPARTUM  Goal: Long Term Goal:Experiences normal postpartum course  Description  INTERVENTIONS:  - Assess and monitor vital signs and lab values. - Assess fundus and lochia. - Provide ice/sitz baths for perineum discomfort.   - Monitor heali pain/trauma. - Instruct and provide assistance with proper latch. - Review techniques for milk expression (breast pumping) and storage of breast milk. Provide pumping equipment/supplies, instructions and assistance, as needed.   - Encourage rooming-in and Obtain nutritional consult as needed  - Establish a toileting routine/schedule  - Consider collaborating with pharmacy to review patient's medication profile  Outcome: Completed  Goal: Maintains adequate nutritional intake (undernourished)  Description  IN

## 2019-07-09 NOTE — PROGRESS NOTES
OB Progress Note POD#1  S: She is feeling well. Moderate VB. Pain controlled. Breastfeeding. O:  Blood pressure 116/69, pulse 79, temperature 98.3 °F (36.8 °C), temperature source Oral, resp.  rate 18, height 5' 6\" (1.676 m), weight 233 lb (105.7 kg),

## 2019-07-09 NOTE — PROGRESS NOTES
[facility]    Patients Name: Senia Steward  Attending Physician: Aditya Carpenter MD  CSN: [id number]    Location:  2216/2216-A  MRN: [id number]    YOB: 1980  Admission Date: 7/8/2019     Obstetric Anesthesia Pain Progress Note

## 2019-07-10 LAB — GLUCOSE BLD-MCNC: 105 MG/DL (ref 70–99)

## 2019-07-10 PROCEDURE — 82962 GLUCOSE BLOOD TEST: CPT

## 2019-07-10 NOTE — PROGRESS NOTES
POD#2  Pt without complaints - tolerating PO well - adequate pain control  /81   Pulse 81   Temp 98.1 °F (36.7 °C) (Oral)   Resp 16   Ht 5' 6\" (1.676 m)   Wt 233 lb (105.7 kg)   LMP 10/15/2018   SpO2 99%   Breastfeeding?  Yes   BMI 37.61 kg/m²   Inci

## 2019-07-11 VITALS
DIASTOLIC BLOOD PRESSURE: 73 MMHG | RESPIRATION RATE: 18 BRPM | WEIGHT: 233 LBS | OXYGEN SATURATION: 99 % | TEMPERATURE: 99 F | HEIGHT: 66 IN | HEART RATE: 74 BPM | BODY MASS INDEX: 37.45 KG/M2 | SYSTOLIC BLOOD PRESSURE: 120 MMHG

## 2019-07-11 LAB — GLUCOSE BLD-MCNC: 94 MG/DL (ref 70–99)

## 2019-07-11 PROCEDURE — 82962 GLUCOSE BLOOD TEST: CPT

## 2019-07-11 RX ORDER — IBUPROFEN 600 MG/1
600 TABLET ORAL EVERY 6 HOURS PRN
Qty: 40 TABLET | Refills: 0 | Status: SHIPPED | OUTPATIENT
Start: 2019-07-11 | End: 2019-08-20

## 2019-07-11 RX ORDER — HYDROCODONE BITARTRATE AND ACETAMINOPHEN 5; 325 MG/1; MG/1
1 TABLET ORAL EVERY 6 HOURS PRN
Qty: 20 TABLET | Status: SHIPPED | OUTPATIENT
Start: 2019-07-11 | End: 2019-07-23 | Stop reason: ALTCHOICE

## 2019-07-11 NOTE — PROGRESS NOTES
OB Progress Note POD#3    S: She is feeling well. Minimal VB. Pain controlled. Breastfeeding/pumping. Voiding without difficulty, + flatus, no BM    O:  Blood pressure 120/73, pulse 74, temperature 98.5 °F (36.9 °C), resp.  rate 18, height 5' 6\" (1.676 m),

## 2019-07-11 NOTE — PROGRESS NOTES
Baby breastfeeding well, and mom pumping, own pump at home. All dc teaching reviewed earlier and all questions answered. Pt states comfortable caring for self and baby.   Discharged in stable condition with family and accompanied by staff at [phone] per MAGGIE MCDONALD JR. [facility]

## 2019-07-12 PROBLEM — O24.419 GESTATIONAL DIABETES: Status: RESOLVED | Noted: 2019-03-13 | Resolved: 2019-07-08

## 2019-07-12 PROBLEM — O99.210 OBESITY COMPLICATING PREGNANCY: Status: RESOLVED | Noted: 2019-03-13 | Resolved: 2019-07-08

## 2019-07-12 PROBLEM — Z87.74: Status: RESOLVED | Noted: 2019-03-13 | Resolved: 2019-07-08

## 2019-07-15 ENCOUNTER — TELEPHONE (OUTPATIENT)
Dept: OBGYN UNIT | Facility: HOSPITAL | Age: 39
End: 2019-07-15

## 2019-07-24 NOTE — DISCHARGE SUMMARY
[facility]  OB Discharge Summary    Sada Rosa Patient Status:  Inpatient    1980 MRN [id number]   [facility] [address] Attending No att. providers found   Hosp Day # 3 PCP Allan Kwon MD     Date of Admission:

## 2022-12-24 ENCOUNTER — HOSPITAL ENCOUNTER (INPATIENT)
Facility: HOSPITAL | Age: 42
LOS: 1 days | Discharge: HOME | DRG: 419 | End: 2022-12-25
Attending: STUDENT IN AN ORGANIZED HEALTH CARE EDUCATION/TRAINING PROGRAM | Admitting: HOSPITALIST

## 2022-12-24 ENCOUNTER — APPOINTMENT (OUTPATIENT)
Dept: ULTRASOUND IMAGING | Age: 42
End: 2022-12-24
Attending: STUDENT IN AN ORGANIZED HEALTH CARE EDUCATION/TRAINING PROGRAM

## 2022-12-24 ENCOUNTER — APPOINTMENT (OUTPATIENT)
Dept: ULTRASOUND IMAGING | Age: 42
DRG: 419 | End: 2022-12-24
Attending: STUDENT IN AN ORGANIZED HEALTH CARE EDUCATION/TRAINING PROGRAM

## 2022-12-24 ENCOUNTER — HOSPITAL ENCOUNTER (INPATIENT)
Facility: HOSPITAL | Age: 42
LOS: 1 days | Discharge: HOME | End: 2022-12-25
Attending: STUDENT IN AN ORGANIZED HEALTH CARE EDUCATION/TRAINING PROGRAM | Admitting: HOSPITALIST

## 2022-12-24 DIAGNOSIS — D64.9 ANEMIA, UNSPECIFIED TYPE: ICD-10-CM

## 2022-12-24 DIAGNOSIS — K80.20 GALLSTONES: ICD-10-CM

## 2022-12-24 DIAGNOSIS — K81.0 ACUTE CHOLECYSTITIS: Primary | ICD-10-CM

## 2022-12-24 DIAGNOSIS — D72.829 LEUKOCYTOSIS, UNSPECIFIED TYPE: ICD-10-CM

## 2022-12-24 LAB
ALBUMIN SERPL-MCNC: 3.9 G/DL (ref 3.4–5)
ALBUMIN/GLOB SERPL: 1 {RATIO} (ref 1–2)
ALP LIVER SERPL-CCNC: 76 U/L
ALT SERPL-CCNC: 23 U/L
ANION GAP SERPL CALC-SCNC: 11 MMOL/L (ref 0–18)
AST SERPL-CCNC: 13 U/L (ref 15–37)
BASOPHILS # BLD AUTO: 0.03 X10(3) UL (ref 0–0.2)
BASOPHILS NFR BLD AUTO: 0.2 %
BILIRUB SERPL-MCNC: 0.3 MG/DL (ref 0.1–2)
BUN BLD-MCNC: 11 MG/DL (ref 7–18)
CALCIUM BLD-MCNC: 9.1 MG/DL (ref 8.5–10.1)
CHLORIDE SERPL-SCNC: 102 MMOL/L (ref 98–112)
CO2 SERPL-SCNC: 24 MMOL/L (ref 21–32)
CREAT BLD-MCNC: 1.12 MG/DL
EOSINOPHIL # BLD AUTO: 0.04 X10(3) UL (ref 0–0.7)
EOSINOPHIL NFR BLD AUTO: 0.2 %
ERYTHROCYTE [DISTWIDTH] IN BLOOD BY AUTOMATED COUNT: 13.6 %
GFR SERPLBLD BASED ON 1.73 SQ M-ARVRAT: 64 ML/MIN/1.73M2 (ref 60–?)
GLOBULIN PLAS-MCNC: 4 G/DL (ref 2.8–4.4)
GLUCOSE BLD-MCNC: 130 MG/DL (ref 70–99)
HCG SERPL QL: NEGATIVE
HCT VFR BLD AUTO: 36.6 %
HGB BLD-MCNC: 11.9 G/DL
IMM GRANULOCYTES # BLD AUTO: 0.07 X10(3) UL (ref 0–1)
IMM GRANULOCYTES NFR BLD: 0.4 %
LIPASE SERPL-CCNC: 142 U/L (ref 73–393)
LYMPHOCYTES # BLD AUTO: 3.87 X10(3) UL (ref 1–4)
LYMPHOCYTES NFR BLD AUTO: 23.8 %
MCH RBC QN AUTO: 26.7 PG (ref 26–34)
MCHC RBC AUTO-ENTMCNC: 32.5 G/DL (ref 31–37)
MCV RBC AUTO: 82.1 FL
MONOCYTES # BLD AUTO: 0.91 X10(3) UL (ref 0.1–1)
MONOCYTES NFR BLD AUTO: 5.6 %
NEUTROPHILS # BLD AUTO: 11.37 X10 (3) UL (ref 1.5–7.7)
NEUTROPHILS # BLD AUTO: 11.37 X10(3) UL (ref 1.5–7.7)
NEUTROPHILS NFR BLD AUTO: 69.8 %
OSMOLALITY SERPL CALC.SUM OF ELEC: 285 MOSM/KG (ref 275–295)
PLATELET # BLD AUTO: 466 10(3)UL (ref 150–450)
POTASSIUM SERPL-SCNC: 3.6 MMOL/L (ref 3.5–5.1)
PROT SERPL-MCNC: 7.9 G/DL (ref 6.4–8.2)
RBC # BLD AUTO: 4.46 X10(6)UL
SARS-COV-2 RNA RESP QL NAA+PROBE: NOT DETECTED
SODIUM SERPL-SCNC: 137 MMOL/L (ref 136–145)
WBC # BLD AUTO: 16.3 X10(3) UL (ref 4–11)

## 2022-12-24 PROCEDURE — 76705 ECHO EXAM OF ABDOMEN: CPT | Performed by: STUDENT IN AN ORGANIZED HEALTH CARE EDUCATION/TRAINING PROGRAM

## 2022-12-24 PROCEDURE — 99253 IP/OBS CNSLTJ NEW/EST LOW 45: CPT | Performed by: STUDENT IN AN ORGANIZED HEALTH CARE EDUCATION/TRAINING PROGRAM

## 2022-12-24 RX ORDER — IBUPROFEN 600 MG/1
600 TABLET ORAL EVERY 4 HOURS PRN
Status: DISCONTINUED | OUTPATIENT
Start: 2022-12-24 | End: 2022-12-24

## 2022-12-24 RX ORDER — HYDROCODONE BITARTRATE AND ACETAMINOPHEN 5; 325 MG/1; MG/1
1 TABLET ORAL EVERY 4 HOURS PRN
Status: DISCONTINUED | OUTPATIENT
Start: 2022-12-24 | End: 2022-12-25

## 2022-12-24 RX ORDER — HYDROMORPHONE HYDROCHLORIDE 1 MG/ML
0.5 INJECTION, SOLUTION INTRAMUSCULAR; INTRAVENOUS; SUBCUTANEOUS EVERY 30 MIN PRN
Status: DISCONTINUED | OUTPATIENT
Start: 2022-12-24 | End: 2022-12-24

## 2022-12-24 RX ORDER — FAMOTIDINE 10 MG/ML
20 INJECTION, SOLUTION INTRAVENOUS ONCE
Status: COMPLETED | OUTPATIENT
Start: 2022-12-24 | End: 2022-12-24

## 2022-12-24 RX ORDER — SODIUM CHLORIDE 9 MG/ML
INJECTION, SOLUTION INTRAVENOUS CONTINUOUS
Status: DISCONTINUED | OUTPATIENT
Start: 2022-12-24 | End: 2022-12-24

## 2022-12-24 RX ORDER — IBUPROFEN 400 MG/1
400 TABLET ORAL EVERY 4 HOURS PRN
Status: DISCONTINUED | OUTPATIENT
Start: 2022-12-24 | End: 2022-12-24

## 2022-12-24 RX ORDER — DEXTROSE AND SODIUM CHLORIDE 5; .45 G/100ML; G/100ML
INJECTION, SOLUTION INTRAVENOUS CONTINUOUS
Status: DISCONTINUED | OUTPATIENT
Start: 2022-12-24 | End: 2022-12-25

## 2022-12-24 RX ORDER — IBUPROFEN 200 MG
200 TABLET ORAL EVERY 4 HOURS PRN
Status: DISCONTINUED | OUTPATIENT
Start: 2022-12-24 | End: 2022-12-24

## 2022-12-24 RX ORDER — KETOROLAC TROMETHAMINE 15 MG/ML
15 INJECTION, SOLUTION INTRAMUSCULAR; INTRAVENOUS ONCE
Status: COMPLETED | OUTPATIENT
Start: 2022-12-24 | End: 2022-12-24

## 2022-12-24 RX ORDER — ONDANSETRON 2 MG/ML
4 INJECTION INTRAMUSCULAR; INTRAVENOUS EVERY 6 HOURS PRN
Status: DISCONTINUED | OUTPATIENT
Start: 2022-12-24 | End: 2022-12-25

## 2022-12-24 RX ORDER — ENOXAPARIN SODIUM 100 MG/ML
40 INJECTION SUBCUTANEOUS DAILY
Status: DISCONTINUED | OUTPATIENT
Start: 2022-12-25 | End: 2022-12-25

## 2022-12-24 RX ORDER — ACETAMINOPHEN 325 MG/1
650 TABLET ORAL EVERY 4 HOURS PRN
Status: DISCONTINUED | OUTPATIENT
Start: 2022-12-24 | End: 2022-12-24

## 2022-12-24 RX ORDER — HYDROMORPHONE HYDROCHLORIDE 1 MG/ML
0.2 INJECTION, SOLUTION INTRAMUSCULAR; INTRAVENOUS; SUBCUTANEOUS EVERY 2 HOUR PRN
Status: DISCONTINUED | OUTPATIENT
Start: 2022-12-24 | End: 2022-12-25

## 2022-12-24 RX ORDER — ONDANSETRON 2 MG/ML
4 INJECTION INTRAMUSCULAR; INTRAVENOUS ONCE
Status: COMPLETED | OUTPATIENT
Start: 2022-12-24 | End: 2022-12-24

## 2022-12-24 RX ORDER — HYDROMORPHONE HYDROCHLORIDE 1 MG/ML
0.8 INJECTION, SOLUTION INTRAMUSCULAR; INTRAVENOUS; SUBCUTANEOUS EVERY 2 HOUR PRN
Status: DISCONTINUED | OUTPATIENT
Start: 2022-12-24 | End: 2022-12-25

## 2022-12-24 RX ORDER — PROCHLORPERAZINE EDISYLATE 5 MG/ML
5 INJECTION INTRAMUSCULAR; INTRAVENOUS EVERY 8 HOURS PRN
Status: DISCONTINUED | OUTPATIENT
Start: 2022-12-24 | End: 2022-12-25

## 2022-12-24 RX ORDER — ONDANSETRON 2 MG/ML
4 INJECTION INTRAMUSCULAR; INTRAVENOUS EVERY 4 HOURS PRN
Status: DISCONTINUED | OUTPATIENT
Start: 2022-12-24 | End: 2022-12-24

## 2022-12-24 RX ORDER — ACETAMINOPHEN 10 MG/ML
1000 INJECTION, SOLUTION INTRAVENOUS EVERY 8 HOURS
Status: DISCONTINUED | OUTPATIENT
Start: 2022-12-24 | End: 2022-12-25

## 2022-12-24 RX ORDER — HYDROMORPHONE HYDROCHLORIDE 1 MG/ML
0.4 INJECTION, SOLUTION INTRAMUSCULAR; INTRAVENOUS; SUBCUTANEOUS EVERY 2 HOUR PRN
Status: DISCONTINUED | OUTPATIENT
Start: 2022-12-24 | End: 2022-12-25

## 2022-12-24 RX ORDER — HYDROCODONE BITARTRATE AND ACETAMINOPHEN 5; 325 MG/1; MG/1
2 TABLET ORAL EVERY 4 HOURS PRN
Status: DISCONTINUED | OUTPATIENT
Start: 2022-12-24 | End: 2022-12-25

## 2022-12-25 ENCOUNTER — APPOINTMENT (OUTPATIENT)
Dept: GENERAL RADIOLOGY | Facility: HOSPITAL | Age: 42
DRG: 419 | End: 2022-12-25
Attending: STUDENT IN AN ORGANIZED HEALTH CARE EDUCATION/TRAINING PROGRAM

## 2022-12-25 ENCOUNTER — APPOINTMENT (OUTPATIENT)
Dept: GENERAL RADIOLOGY | Facility: HOSPITAL | Age: 42
End: 2022-12-25
Attending: STUDENT IN AN ORGANIZED HEALTH CARE EDUCATION/TRAINING PROGRAM

## 2022-12-25 ENCOUNTER — ANESTHESIA EVENT (OUTPATIENT)
Dept: SURGERY | Facility: HOSPITAL | Age: 42
End: 2022-12-25

## 2022-12-25 ENCOUNTER — ANESTHESIA (OUTPATIENT)
Dept: SURGERY | Facility: HOSPITAL | Age: 42
End: 2022-12-25

## 2022-12-25 VITALS
HEART RATE: 106 BPM | TEMPERATURE: 98 F | WEIGHT: 226 LBS | HEIGHT: 66 IN | OXYGEN SATURATION: 98 % | RESPIRATION RATE: 16 BRPM | BODY MASS INDEX: 36.32 KG/M2 | SYSTOLIC BLOOD PRESSURE: 122 MMHG | DIASTOLIC BLOOD PRESSURE: 66 MMHG

## 2022-12-25 DIAGNOSIS — K81.0 ACUTE CHOLECYSTITIS: Primary | ICD-10-CM

## 2022-12-25 LAB
ALBUMIN SERPL-MCNC: 3.1 G/DL (ref 3.4–5)
ALBUMIN/GLOB SERPL: 0.9 {RATIO} (ref 1–2)
ALP LIVER SERPL-CCNC: 64 U/L
ALT SERPL-CCNC: 14 U/L
ANION GAP SERPL CALC-SCNC: 7 MMOL/L (ref 0–18)
AST SERPL-CCNC: 9 U/L (ref 15–37)
BILIRUB SERPL-MCNC: 0.4 MG/DL (ref 0.1–2)
BILIRUB UR QL STRIP.AUTO: NEGATIVE
BUN BLD-MCNC: 10 MG/DL (ref 7–18)
CALCIUM BLD-MCNC: 8.5 MG/DL (ref 8.5–10.1)
CHLORIDE SERPL-SCNC: 107 MMOL/L (ref 98–112)
CLARITY UR REFRACT.AUTO: CLEAR
CO2 SERPL-SCNC: 25 MMOL/L (ref 21–32)
COLOR UR AUTO: YELLOW
CREAT BLD-MCNC: 1.06 MG/DL
GFR SERPLBLD BASED ON 1.73 SQ M-ARVRAT: 68 ML/MIN/1.73M2 (ref 60–?)
GLOBULIN PLAS-MCNC: 3.6 G/DL (ref 2.8–4.4)
GLUCOSE BLD-MCNC: 131 MG/DL (ref 70–99)
GLUCOSE UR STRIP.AUTO-MCNC: NEGATIVE MG/DL
KETONES UR STRIP.AUTO-MCNC: NEGATIVE MG/DL
LEUKOCYTE ESTERASE UR QL STRIP.AUTO: NEGATIVE
LIPASE SERPL-CCNC: 142 U/L (ref 73–393)
NITRITE UR QL STRIP.AUTO: NEGATIVE
OSMOLALITY SERPL CALC.SUM OF ELEC: 289 MOSM/KG (ref 275–295)
PH UR STRIP.AUTO: 5.5 [PH] (ref 5–8)
POTASSIUM SERPL-SCNC: 4.6 MMOL/L (ref 3.5–5.1)
PROT SERPL-MCNC: 6.7 G/DL (ref 6.4–8.2)
PROT UR STRIP.AUTO-MCNC: NEGATIVE MG/DL
RBC UR QL AUTO: NEGATIVE
SODIUM SERPL-SCNC: 139 MMOL/L (ref 136–145)
SP GR UR STRIP.AUTO: 1.02 (ref 1–1.03)
UROBILINOGEN UR STRIP.AUTO-MCNC: 0.2 MG/DL

## 2022-12-25 PROCEDURE — 47563 LAPARO CHOLECYSTECTOMY/GRAPH: CPT | Performed by: STUDENT IN AN ORGANIZED HEALTH CARE EDUCATION/TRAINING PROGRAM

## 2022-12-25 PROCEDURE — BF141ZZ FLUOROSCOPY OF GALLBLADDER, BILE DUCTS AND PANCREATIC DUCTS USING LOW OSMOLAR CONTRAST: ICD-10-PCS | Performed by: STUDENT IN AN ORGANIZED HEALTH CARE EDUCATION/TRAINING PROGRAM

## 2022-12-25 PROCEDURE — 47563 LAPARO CHOLECYSTECTOMY/GRAPH: CPT

## 2022-12-25 PROCEDURE — 74300 X-RAY BILE DUCTS/PANCREAS: CPT | Performed by: STUDENT IN AN ORGANIZED HEALTH CARE EDUCATION/TRAINING PROGRAM

## 2022-12-25 PROCEDURE — 0WQF0ZZ REPAIR ABDOMINAL WALL, OPEN APPROACH: ICD-10-PCS | Performed by: STUDENT IN AN ORGANIZED HEALTH CARE EDUCATION/TRAINING PROGRAM

## 2022-12-25 PROCEDURE — 0FT44ZZ RESECTION OF GALLBLADDER, PERCUTANEOUS ENDOSCOPIC APPROACH: ICD-10-PCS | Performed by: STUDENT IN AN ORGANIZED HEALTH CARE EDUCATION/TRAINING PROGRAM

## 2022-12-25 RX ORDER — AMOXICILLIN AND CLAVULANATE POTASSIUM 875; 125 MG/1; MG/1
1 TABLET, FILM COATED ORAL 2 TIMES DAILY
Qty: 10 TABLET | Refills: 0 | Status: SHIPPED | OUTPATIENT
Start: 2022-12-25 | End: 2022-12-30

## 2022-12-25 RX ORDER — HYDROCODONE BITARTRATE AND ACETAMINOPHEN 5; 325 MG/1; MG/1
2 TABLET ORAL ONCE AS NEEDED
Status: DISCONTINUED | OUTPATIENT
Start: 2022-12-25 | End: 2022-12-25 | Stop reason: HOSPADM

## 2022-12-25 RX ORDER — NICOTINE POLACRILEX 4 MG
15 LOZENGE BUCCAL
Status: DISCONTINUED | OUTPATIENT
Start: 2022-12-25 | End: 2022-12-25 | Stop reason: HOSPADM

## 2022-12-25 RX ORDER — OXYCODONE HYDROCHLORIDE 5 MG/1
5 TABLET ORAL EVERY 6 HOURS PRN
Qty: 15 TABLET | Refills: 0 | Status: SHIPPED | OUTPATIENT
Start: 2022-12-25

## 2022-12-25 RX ORDER — NEOSTIGMINE METHYLSULFATE 1 MG/ML
INJECTION, SOLUTION INTRAVENOUS AS NEEDED
Status: DISCONTINUED | OUTPATIENT
Start: 2022-12-25 | End: 2022-12-25 | Stop reason: SURG

## 2022-12-25 RX ORDER — ACETAMINOPHEN 500 MG
1000 TABLET ORAL ONCE AS NEEDED
Status: DISCONTINUED | OUTPATIENT
Start: 2022-12-25 | End: 2022-12-25 | Stop reason: HOSPADM

## 2022-12-25 RX ORDER — HYDROMORPHONE HYDROCHLORIDE 1 MG/ML
INJECTION, SOLUTION INTRAMUSCULAR; INTRAVENOUS; SUBCUTANEOUS
Status: COMPLETED
Start: 2022-12-25 | End: 2022-12-25

## 2022-12-25 RX ORDER — CEFOXITIN 2 G/1
INJECTION, POWDER, FOR SOLUTION INTRAVENOUS AS NEEDED
Status: DISCONTINUED | OUTPATIENT
Start: 2022-12-25 | End: 2022-12-25 | Stop reason: SURG

## 2022-12-25 RX ORDER — ACETAMINOPHEN 325 MG/1
650 TABLET ORAL EVERY 6 HOURS PRN
Status: DISCONTINUED | OUTPATIENT
Start: 2022-12-25 | End: 2022-12-25

## 2022-12-25 RX ORDER — BUPIVACAINE HYDROCHLORIDE 2.5 MG/ML
INJECTION, SOLUTION EPIDURAL; INFILTRATION; INTRACAUDAL AS NEEDED
Status: DISCONTINUED | OUTPATIENT
Start: 2022-12-25 | End: 2022-12-25 | Stop reason: HOSPADM

## 2022-12-25 RX ORDER — KETOROLAC TROMETHAMINE 30 MG/ML
30 INJECTION, SOLUTION INTRAMUSCULAR; INTRAVENOUS ONCE
Status: COMPLETED | OUTPATIENT
Start: 2022-12-25 | End: 2022-12-25

## 2022-12-25 RX ORDER — HYDROMORPHONE HYDROCHLORIDE 1 MG/ML
0.2 INJECTION, SOLUTION INTRAMUSCULAR; INTRAVENOUS; SUBCUTANEOUS EVERY 5 MIN PRN
Status: DISCONTINUED | OUTPATIENT
Start: 2022-12-25 | End: 2022-12-25 | Stop reason: HOSPADM

## 2022-12-25 RX ORDER — HYDROMORPHONE HYDROCHLORIDE 1 MG/ML
0.4 INJECTION, SOLUTION INTRAMUSCULAR; INTRAVENOUS; SUBCUTANEOUS EVERY 5 MIN PRN
Status: DISCONTINUED | OUTPATIENT
Start: 2022-12-25 | End: 2022-12-25 | Stop reason: HOSPADM

## 2022-12-25 RX ORDER — SODIUM CHLORIDE 9 MG/ML
INJECTION, SOLUTION INTRAVENOUS CONTINUOUS PRN
Status: DISCONTINUED | OUTPATIENT
Start: 2022-12-25 | End: 2022-12-25 | Stop reason: SURG

## 2022-12-25 RX ORDER — SODIUM CHLORIDE, SODIUM LACTATE, POTASSIUM CHLORIDE, CALCIUM CHLORIDE 600; 310; 30; 20 MG/100ML; MG/100ML; MG/100ML; MG/100ML
INJECTION, SOLUTION INTRAVENOUS CONTINUOUS
Status: DISCONTINUED | OUTPATIENT
Start: 2022-12-25 | End: 2022-12-25 | Stop reason: HOSPADM

## 2022-12-25 RX ORDER — METOCLOPRAMIDE HYDROCHLORIDE 5 MG/ML
INJECTION INTRAMUSCULAR; INTRAVENOUS AS NEEDED
Status: DISCONTINUED | OUTPATIENT
Start: 2022-12-25 | End: 2022-12-25 | Stop reason: SURG

## 2022-12-25 RX ORDER — ROCURONIUM BROMIDE 10 MG/ML
INJECTION, SOLUTION INTRAVENOUS AS NEEDED
Status: DISCONTINUED | OUTPATIENT
Start: 2022-12-25 | End: 2022-12-25 | Stop reason: SURG

## 2022-12-25 RX ORDER — NICOTINE POLACRILEX 4 MG
30 LOZENGE BUCCAL
Status: DISCONTINUED | OUTPATIENT
Start: 2022-12-25 | End: 2022-12-25 | Stop reason: HOSPADM

## 2022-12-25 RX ORDER — HYDROCODONE BITARTRATE AND ACETAMINOPHEN 5; 325 MG/1; MG/1
1 TABLET ORAL ONCE AS NEEDED
Status: DISCONTINUED | OUTPATIENT
Start: 2022-12-25 | End: 2022-12-25 | Stop reason: HOSPADM

## 2022-12-25 RX ORDER — GLYCOPYRROLATE 0.2 MG/ML
INJECTION, SOLUTION INTRAMUSCULAR; INTRAVENOUS AS NEEDED
Status: DISCONTINUED | OUTPATIENT
Start: 2022-12-25 | End: 2022-12-25 | Stop reason: SURG

## 2022-12-25 RX ORDER — KETOROLAC TROMETHAMINE 30 MG/ML
INJECTION, SOLUTION INTRAMUSCULAR; INTRAVENOUS
Status: COMPLETED
Start: 2022-12-25 | End: 2022-12-25

## 2022-12-25 RX ORDER — MIDAZOLAM HYDROCHLORIDE 1 MG/ML
INJECTION INTRAMUSCULAR; INTRAVENOUS AS NEEDED
Status: DISCONTINUED | OUTPATIENT
Start: 2022-12-25 | End: 2022-12-25 | Stop reason: SURG

## 2022-12-25 RX ORDER — HYDROMORPHONE HYDROCHLORIDE 1 MG/ML
0.6 INJECTION, SOLUTION INTRAMUSCULAR; INTRAVENOUS; SUBCUTANEOUS EVERY 5 MIN PRN
Status: DISCONTINUED | OUTPATIENT
Start: 2022-12-25 | End: 2022-12-25 | Stop reason: HOSPADM

## 2022-12-25 RX ORDER — DEXTROSE MONOHYDRATE 25 G/50ML
50 INJECTION, SOLUTION INTRAVENOUS
Status: DISCONTINUED | OUTPATIENT
Start: 2022-12-25 | End: 2022-12-25 | Stop reason: HOSPADM

## 2022-12-25 RX ORDER — NALOXONE HYDROCHLORIDE 0.4 MG/ML
80 INJECTION, SOLUTION INTRAMUSCULAR; INTRAVENOUS; SUBCUTANEOUS AS NEEDED
Status: DISCONTINUED | OUTPATIENT
Start: 2022-12-25 | End: 2022-12-25 | Stop reason: HOSPADM

## 2022-12-25 RX ORDER — LIDOCAINE HYDROCHLORIDE 10 MG/ML
INJECTION, SOLUTION EPIDURAL; INFILTRATION; INTRACAUDAL; PERINEURAL AS NEEDED
Status: DISCONTINUED | OUTPATIENT
Start: 2022-12-25 | End: 2022-12-25 | Stop reason: SURG

## 2022-12-25 RX ORDER — PROCHLORPERAZINE EDISYLATE 5 MG/ML
5 INJECTION INTRAMUSCULAR; INTRAVENOUS EVERY 8 HOURS PRN
Status: DISCONTINUED | OUTPATIENT
Start: 2022-12-25 | End: 2022-12-25 | Stop reason: HOSPADM

## 2022-12-25 RX ORDER — ONDANSETRON 2 MG/ML
4 INJECTION INTRAMUSCULAR; INTRAVENOUS EVERY 6 HOURS PRN
Status: DISCONTINUED | OUTPATIENT
Start: 2022-12-25 | End: 2022-12-25 | Stop reason: HOSPADM

## 2022-12-25 RX ORDER — ONDANSETRON 2 MG/ML
INJECTION INTRAMUSCULAR; INTRAVENOUS AS NEEDED
Status: DISCONTINUED | OUTPATIENT
Start: 2022-12-25 | End: 2022-12-25 | Stop reason: SURG

## 2022-12-25 RX ADMIN — NEOSTIGMINE METHYLSULFATE 5 MG: 1 INJECTION, SOLUTION INTRAVENOUS at 12:55:00

## 2022-12-25 RX ADMIN — ROCURONIUM BROMIDE 50 MG: 10 INJECTION, SOLUTION INTRAVENOUS at 11:26:00

## 2022-12-25 RX ADMIN — MIDAZOLAM HYDROCHLORIDE 2 MG: 1 INJECTION INTRAMUSCULAR; INTRAVENOUS at 11:22:00

## 2022-12-25 RX ADMIN — ROCURONIUM BROMIDE 20 MG: 10 INJECTION, SOLUTION INTRAVENOUS at 11:53:00

## 2022-12-25 RX ADMIN — LIDOCAINE HYDROCHLORIDE 50 MG: 10 INJECTION, SOLUTION EPIDURAL; INFILTRATION; INTRACAUDAL; PERINEURAL at 11:26:00

## 2022-12-25 RX ADMIN — SODIUM CHLORIDE: 9 INJECTION, SOLUTION INTRAVENOUS at 11:23:00

## 2022-12-25 RX ADMIN — SODIUM CHLORIDE: 9 INJECTION, SOLUTION INTRAVENOUS at 13:22:00

## 2022-12-25 RX ADMIN — CEFOXITIN 2 G: 2 INJECTION, POWDER, FOR SOLUTION INTRAVENOUS at 11:42:00

## 2022-12-25 RX ADMIN — ONDANSETRON 4 MG: 2 INJECTION INTRAMUSCULAR; INTRAVENOUS at 12:19:00

## 2022-12-25 RX ADMIN — GLYCOPYRROLATE 0.8 MG: 0.2 INJECTION, SOLUTION INTRAMUSCULAR; INTRAVENOUS at 12:55:00

## 2022-12-25 RX ADMIN — METOCLOPRAMIDE HYDROCHLORIDE 10 MG: 5 INJECTION INTRAMUSCULAR; INTRAVENOUS at 12:19:00

## 2022-12-25 NOTE — ED QUICK NOTES
Orders for admission, patient is aware of plan and ready to go upstairs. Any questions, please call ED RN Joshua Melara at extension [phone].      Patient Covid vaccination status: Unvaccinated     COVID Test Ordered in ED: Rapid SARS-CoV-2 by PCR    COVID Suspicion at Admission: No risk with negative rapid    Running Infusions:      Mental Status/LOC at time of transport: A+OX3    Other pertinent information: Will be arriving by car  CIWA score: N/A   NIH score:  N/A

## 2022-12-25 NOTE — PLAN OF CARE
Problem: Patient/Family Goals  Goal: Patient/Family Short Term Goal  Description: Patient's Short Term Goal: pain/comfort    Interventions:   - PRN pain meds  - PRN antiemetics  - See additional Care Plan goals for specific interventions  Outcome: Progressing     Problem: PAIN - ADULT  Goal: Verbalizes/displays adequate comfort level or patient's stated pain goal  Description: INTERVENTIONS:  - Encourage pt to monitor pain and request assistance  - Assess pain using appropriate pain scale  - Administer analgesics based on type and severity of pain and evaluate response  - Implement non-pharmacological measures as appropriate and evaluate response  - Consider cultural and social influences on pain and pain management  - Manage/alleviate anxiety  - Utilize distraction and/or relaxation techniques  - Monitor for opioid side effects  - Notify MD/LIP if interventions unsuccessful or patient reports new pain  - Anticipate increased pain with activity and pre-medicate as appropriate  Outcome: Progressing     Problem: RISK FOR INFECTION - ADULT  Goal: Absence of fever/infection during anticipated neutropenic period  Description: INTERVENTIONS  - Monitor WBC  - Administer growth factors as ordered  - Implement neutropenic guidelines  Outcome: Progressing     Problem: SAFETY ADULT - FALL  Goal: Free from fall injury  Description: INTERVENTIONS:  - Assess pt frequently for physical needs  - Identify cognitive and physical deficits and behaviors that affect risk of falls.   - [place] fall precautions as indicated by assessment.  - Educate pt/family on patient safety including physical limitations  - Instruct pt to call for assistance with activity based on assessment  - Modify environment to reduce risk of injury  - Provide assistive devices as appropriate  - Consider OT/PT consult to assist with strengthening/mobility  - Encourage toileting schedule  Outcome: Progressing     Problem: GASTROINTESTINAL - ADULT  Goal: Minimal or absence of nausea and vomiting  Description: INTERVENTIONS:  - Maintain adequate hydration with IV or PO as ordered and tolerated  - Nasogastric tube to low intermittent suction as ordered  - Evaluate effectiveness of ordered antiemetic medications  - Provide nonpharmacologic comfort measures as appropriate  - Advance diet as tolerated, if ordered  - Obtain nutritional consult as needed  - Evaluate fluid balance  Outcome: Progressing     Problem: Patient/Family Goals  Goal: Patient/Family Long Term Goal  Description: Patient's Long Term Goal: discharge home    Interventions:  - ambulate  - Sx consult  - tolerate pain  - tolerate diet  - See additional Care Plan goals for specific interventions  Outcome: Not Progressing     Problem: DISCHARGE PLANNING  Goal: Discharge to home or other facility with appropriate resources  Description: INTERVENTIONS:  - Identify barriers to discharge w/pt and caregiver  - Include patient/family/discharge partner in discharge planning  - Arrange for needed discharge resources and transportation as appropriate  - Identify discharge learning needs (meds, wound care, etc)  - Arrange for interpreters to assist at discharge as needed  - Consider post-discharge preferences of patient/family/discharge partner  - Complete POLST form as appropriate  - Assess patient's ability to be responsible for managing their own health  - Refer to Case Management Department for coordinating discharge planning if the patient needs post-hospital services based on physician/LIP order or complex needs related to functional status, cognitive ability or social support system  Outcome: Not Progressing     Problem: GASTROINTESTINAL - ADULT  Goal: Maintains or returns to baseline bowel function  Description: INTERVENTIONS:  - Assess bowel function  - Maintain adequate hydration with IV or PO as ordered and tolerated  - Evaluate effectiveness of GI medications  - Encourage mobilization and activity  - Obtain nutritional consult as needed  - Establish a toileting routine/schedule  - Consider collaborating with pharmacy to review patient's medication profile  Outcome: Not Progressing

## 2022-12-25 NOTE — ANESTHESIA POSTPROCEDURE EVALUATION
[address] [place] Patient Status:  Inpatient   Age/Gender 38year old female MRN [id number]   Location [address] [facility] Attending Jaelyn Butt MD   Hosp Day # 1 PCP Stacey Hoffman MD       Anesthesia Post-op Note    LAPAROSCOPIC CHOLECYSTECTOMY WITH CHOLANGIOGRAM, UMBILICAL HERNIA REPAIR    Procedure Summary     Date: 12/25/22 Room / Location: [facility] MAIN OR 09 / [facility] MAIN OR    Anesthesia Start: 2787 Anesthesia Stop: 5142    Procedure: LAPAROSCOPIC CHOLECYSTECTOMY WITH CHOLANGIOGRAM, UMBILICAL HERNIA REPAIR (Abdomen) Diagnosis:       Acute cholecystitis      (Acute cholecystitis [K81.0])    Surgeons: Heide Coleman MD Anesthesiologist:     Anesthesia Type: general ASA Status: 2 - Emergent          Anesthesia Type: general    Vitals Value Taken Time   /64 12/25/22 1322   Temp 99.5 12/25/22 1322   Pulse 104 12/25/22 1322   Resp 22 12/25/22 1322   SpO2 98 12/25/22 1322       Patient Location: PACU    Anesthesia Type: general    Airway Patency: extubated and patent    Postop Pain Control: adequate    Mental Status: mildly sedated but able to meaningfully participate in the post-anesthesia evaluation    Nausea/Vomiting: none    Cardiopulmonary/Hydration status: stable euvolemic    Complications: no apparent anesthesia related complications    Postop vital signs: stable    Dental Exam: Unchanged from Preop    Patient to be discharged from PACU when criteria met.

## 2022-12-25 NOTE — PLAN OF CARE
A&Ox4. VSS. RA. . Resp: Clear bilaterally  GI: Abdomen soft, tender. Hypoactive bowel sounds, passing gas. : Voids. Pain controlled with PRN pain medications  Up with standby assist.  Diet: NPO  Denies nausea. IVF running per order. All appropriate safety measures in place.  All questions and concerns addressed     Problem: Patient/Family Goals  Goal: Patient/Family Long Term Goal  Description: Patient's Long Term Goal: discharge home    Interventions:  - ambulate  - Sx consult  - tolerate pain  - tolerate diet  - See additional Care Plan goals for specific interventions  Outcome: Progressing  Goal: Patient/Family Short Term Goal  Description: Patient's Short Term Goal: pain/comfort    Interventions:   - PRN pain meds  - PRN antiemetics  - See additional Care Plan goals for specific interventions  Outcome: Progressing     Problem: PAIN - ADULT  Goal: Verbalizes/displays adequate comfort level or patient's stated pain goal  Description: INTERVENTIONS:  - Encourage pt to monitor pain and request assistance  - Assess pain using appropriate pain scale  - Administer analgesics based on type and severity of pain and evaluate response  - Implement non-pharmacological measures as appropriate and evaluate response  - Consider cultural and social influences on pain and pain management  - Manage/alleviate anxiety  - Utilize distraction and/or relaxation techniques  - Monitor for opioid side effects  - Notify MD/LIP if interventions unsuccessful or patient reports new pain  - Anticipate increased pain with activity and pre-medicate as appropriate  Outcome: Progressing     Problem: RISK FOR INFECTION - ADULT  Goal: Absence of fever/infection during anticipated neutropenic period  Description: INTERVENTIONS  - Monitor WBC  - Administer growth factors as ordered  - Implement neutropenic guidelines  Outcome: Progressing     Problem: SAFETY ADULT - FALL  Goal: Free from fall injury  Description: INTERVENTIONS:  - Assess pt frequently for physical needs  - Identify cognitive and physical deficits and behaviors that affect risk of falls.   - [place] fall precautions as indicated by assessment.  - Educate pt/family on patient safety including physical limitations  - Instruct pt to call for assistance with activity based on assessment  - Modify environment to reduce risk of injury  - Provide assistive devices as appropriate  - Consider OT/PT consult to assist with strengthening/mobility  - Encourage toileting schedule  Outcome: Progressing     Problem: DISCHARGE PLANNING  Goal: Discharge to home or other facility with appropriate resources  Description: INTERVENTIONS:  - Identify barriers to discharge w/pt and caregiver  - Include patient/family/discharge partner in discharge planning  - Arrange for needed discharge resources and transportation as appropriate  - Identify discharge learning needs (meds, wound care, etc)  - Arrange for interpreters to assist at discharge as needed  - Consider post-discharge preferences of patient/family/discharge partner  - Complete POLST form as appropriate  - Assess patient's ability to be responsible for managing their own health  - Refer to Case Management Department for coordinating discharge planning if the patient needs post-hospital services based on physician/LIP order or complex needs related to functional status, cognitive ability or social support system  Outcome: Progressing     Problem: GASTROINTESTINAL - ADULT  Goal: Minimal or absence of nausea and vomiting  Description: INTERVENTIONS:  - Maintain adequate hydration with IV or PO as ordered and tolerated  - Nasogastric tube to low intermittent suction as ordered  - Evaluate effectiveness of ordered antiemetic medications  - Provide nonpharmacologic comfort measures as appropriate  - Advance diet as tolerated, if ordered  - Obtain nutritional consult as needed  - Evaluate fluid balance  Outcome: Progressing  Goal: Maintains or returns to baseline bowel function  Description: INTERVENTIONS:  - Assess bowel function  - Maintain adequate hydration with IV or PO as ordered and tolerated  - Evaluate effectiveness of GI medications  - Encourage mobilization and activity  - Obtain nutritional consult as needed  - Establish a toileting routine/schedule  - Consider collaborating with pharmacy to review patient's medication profile  Outcome: Progressing

## 2022-12-25 NOTE — OPERATIVE REPORT
OPERATIVE REPORT   PREOPERATIVE DIAGNOSIS: Cholecystitis and umbilical hernia. POSTOPERATIVE DIAGNOSIS: Cholecystitis and umbilical hernia. PROCEDURE PERFORMED: Laparoscopic cholecystectomy with intraoperative cholangiogram, open umbilical hernia repair  ASSISTANT: Ms Radha Edmondson PA-C. A surgical assistant was absolutely essential to the proper conduct of this case, particularly in the performance of the cholangiogram, as well as the careful dissection necessary in and around the hilum of the gallbladder. ANESTHESIA: General endotracheal anesthesia. No anesthesia staff entered. BLOOD LOSS: 5 mL. COMPLICATIONS: None apparent. FINDINGS:   1. Acute inflammation of the gallbladder  2. Normal IOC  3.  3 cm x 1.5 cm umbilical hernia    DISPOSITION: The patient was awakened from anesthesia and brought to the recovery room in stable condition having tolerated the procedure without apparent complication. Needle, sponge, and instrument counts were correct at the end of the procedure. Please note, preprocedure antibiotic and DVT prophylaxis were administered per protocol, and a time-out was performed prior to initiating operation, identifying the correct patient, procedure, and surgeon. INDICATIONS FOR PROCEDURE: Please review the preprocedural history and physical. Briefly, the patient is a 38year old female who now presents for cholecystectomy. The risks, benefits, and alternatives of surgical intervention were explained to the patient in detail including but not limited to bleeding, infection, recurrence, incorrect diagnosis, injury to adjacent organs and structures, and bile duct injury requiring possible immediate or delayed reconstruction potentially by a hepatobiliary surgeon, postoperative bile leak with retained common bile duct stone, the need for post-procedure ERCP as well as the need for further therapeutic diagnostic or surgical intervention.  The possibility of conversion to open procedure was also explained to the patient. The patient did voice understanding. Allpertinent questions were answered to the patient's satisfaction after which the patient provided willing and informed consent to proceed with surgery. DESCRIPTION OF PROCEDURE: The patient was brought to the operating room, and, after the induction of general endotracheal anesthesia, the abdomen was prepped and draped in the usual sterile fashion. An incision was made in the superior aspect of the umbilicus. The subcutaneous tissue was bluntly dissected around the umbilical hernia sac. The hernia sac was  from the umbilical stalk using electrocautery. Hernia sac was then dissected all the way down to the fascial edge and the fascial edge was cleared circumferentially. The hernia sac was then transected and the contents were reduced into the abdomen. A 12 mm Jarrell trocar was placed and secured and pneumoperitoneum was established in usual manner. Next, a 10-mm laparoscope were placed into the abdomen. Brief diagnostic survey revealed no other acute pathology. Attention was then turned to the right upper quadrant. The patient was positioned with head up, right side up, and three 5-mm incisions were made in the upper abdomen, one in the subxiphoid position and two in the subcostal region, through which 5-mm trocars were placed into the abdomen. The gallbladder was grasped, elevated, and retracted. Dissection was initiated in the triangle of Calot, with the cystic duct and cystic artery being identified superior to the line of Rouviere, and a critical view was obtained. Next, a clip was placed on the specimen side of the cystic duct, and a cystic ductotomy was created through which a cholangiocatheter was introduced, and a cholangiogram was performed. The cholangiogram reveals brisk and immediate contrast excretion into the duodenum.  The cystic duct, common bile duct, hepatic duct, intrahepatic biliary radicals were all free of lesions, stenoses, strictures, filling defects, or other abnormalities. Representative images were submitted to the radiologist. The cholangiocatheter was removed, and three clips were placed on the patient's side of the cystic duct, which was divided by Endoshears. Next, one clip was placed on the cystic artery on the specimen side, three towards the patient side, and the cystic artery was divided with the Endoshears. Next, the gallbladder was elevated from the gallbladder fossa using electrocautery. Once the gallbladder was elevated from the gallbladder fossa, it was extracted from the umbilical trocar site and sent to Pathology for specimen. Attention was turned to achieve hemostasis on the gallbladder fossa; this was achieved with electrocautery. The right upper quadrant was then copiously irrigated until the effluent fluid was clear. The previously placed clips on the cystic duct and cystic artery were visualized and inspected; they were well approximated, well situated, and there was no evidence of active bleeding or bile leak. At this point, the pneumoperitoneum was released, and the upper trocars were removed under vision. The laparoscope was removed from the umbilicus, as was the trocar. To repair the umbilical hernia, the fascia at the umbilicus was reapproximated using #1 PDS suture in an interrupted. Due to risk of wound contamination from a clean contaminated procedure removal of the gallbladder incision was made did not place mesh for this repair. The umbilical stalk was tacked down to the fascia using 3-0 Vicryl suture. All skin incisions were cleansed, irrigated, and injected with local anesthetic. Skin incisions were reapproximated using subcuticular 4-0 Monocryl suture. Skin glue was used to seal all the incisions. The patient was awakened from anesthesia and brought to the recovery room in stable condition, having tolerated the procedure without apparent complication.   Operative findings were discussed with the patient's family immediately upon conclusion of the procedure.      Lalita Sin MD

## 2022-12-25 NOTE — ED INITIAL ASSESSMENT (HPI)
C/o mid and RUQ abd pain starting at 0400  - patient reports this happens intermittently and is usually resolved with tylenol, but this episode is not

## 2022-12-25 NOTE — PROGRESS NOTES
12/25/22 1514   Clinical Encounter Type   Visited With Patient not available   Continue Visiting Yes

## 2022-12-25 NOTE — PROGRESS NOTES
NURSING DISCHARGE NOTE    Discharged Home via Wheelchair. Accompanied by Spouse and Support staff  Belongings Taken by patient/family     Verbal and written discharge instructions and prescriptions given to patient. Verbalized understanding. No flatus but burping. tolerated diet. (Yasmin GEORGES aware). Voided. Up n room and hallway . Abdominal binder in use. To home in stable condition.

## 2022-12-25 NOTE — ANESTHESIA PROCEDURE NOTES
Airway  Date/Time: 12/25/2022 11:28 AM  Urgency: elective    Airway not difficult    General Information and Staff    Patient location during procedure: OR  Anesthesiologist: Amina Simons DO  Performed: anesthesiologist     Indications and Patient Condition  Indications for airway management: anesthesia  Sedation level: deep  Preoxygenated: yes  Patient position: sniffing  Mask difficulty assessment: 1 - vent by mask    Final Airway Details  Final airway type: endotracheal airway      Successful airway: ETT  Cuffed: yes   Successful intubation technique: direct laryngoscopy  Endotracheal tube insertion site: oral  Blade: Long  Blade size: #3  ETT size (mm): 7.0    Cormack-Lehane Classification: grade I - full view of glottis  Placement verified by: chest auscultation and capnometry   Measured from: lips  ETT to lips (cm): 21  Number of attempts at approach: 1  Number of other approaches attempted: 0

## 2022-12-25 NOTE — PROGRESS NOTES
Received from PACU per bed. Alert & oriented x4. With tolerable pain. No nausea or vomiting. With lap.sites x4 with skin glue c/d/i. DUe to void. Plan of care discussed with patient. Will monitor.

## 2022-12-25 NOTE — PROGRESS NOTES
Alert & oriented x4. With tolerable pain. Passing flatus . Kept NPO. Pre-op teachings given. Up in room . Plan of care discussed with patient. To surgery per bed.

## 2022-12-26 NOTE — PLAN OF CARE
Problem: Patient/Family Goals  Goal: Patient/Family Short Term Goal  Description: Patient's Short Term Goal: pain/comfort    Interventions:   - PRN pain meds  - PRN antiemetics  - See additional Care Plan goals for specific interventions  Outcome: Progressing     Problem: PAIN - ADULT  Goal: Verbalizes/displays adequate comfort level or patient's stated pain goal  Description: INTERVENTIONS:  - Encourage pt to monitor pain and request assistance  - Assess pain using appropriate pain scale  - Administer analgesics based on type and severity of pain and evaluate response  - Implement non-pharmacological measures as appropriate and evaluate response  - Consider cultural and social influences on pain and pain management  - Manage/alleviate anxiety  - Utilize distraction and/or relaxation techniques  - Monitor for opioid side effects  - Notify MD/LIP if interventions unsuccessful or patient reports new pain  - Anticipate increased pain with activity and pre-medicate as appropriate  Outcome: Progressing     Problem: RISK FOR INFECTION - ADULT  Goal: Absence of fever/infection during anticipated neutropenic period  Description: INTERVENTIONS  - Monitor WBC  - Administer growth factors as ordered  - Implement neutropenic guidelines  Outcome: Progressing     Problem: SAFETY ADULT - FALL  Goal: Free from fall injury  Description: INTERVENTIONS:  - Assess pt frequently for physical needs  - Identify cognitive and physical deficits and behaviors that affect risk of falls.   - [place] fall precautions as indicated by assessment.  - Educate pt/family on patient safety including physical limitations  - Instruct pt to call for assistance with activity based on assessment  - Modify environment to reduce risk of injury  - Provide assistive devices as appropriate  - Consider OT/PT consult to assist with strengthening/mobility  - Encourage toileting schedule  Outcome: Progressing     Problem: GASTROINTESTINAL - ADULT  Goal: Minimal or absence of nausea and vomiting  Description: INTERVENTIONS:  - Maintain adequate hydration with IV or PO as ordered and tolerated  - Nasogastric tube to low intermittent suction as ordered  - Evaluate effectiveness of ordered antiemetic medications  - Provide nonpharmacologic comfort measures as appropriate  - Advance diet as tolerated, if ordered  - Obtain nutritional consult as needed  - Evaluate fluid balance  Outcome: Progressing

## 2023-01-09 ENCOUNTER — OFFICE VISIT (OUTPATIENT)
Facility: LOCATION | Age: 43
End: 2023-01-09

## 2023-01-09 VITALS — HEART RATE: 110 BPM | TEMPERATURE: 99 F

## 2023-01-09 DIAGNOSIS — Z98.890 POST-OPERATIVE STATE: Primary | ICD-10-CM

## 2023-01-09 PROCEDURE — 99024 POSTOP FOLLOW-UP VISIT: CPT | Performed by: STUDENT IN AN ORGANIZED HEALTH CARE EDUCATION/TRAINING PROGRAM

## (undated) DEVICE — TROCAR: Brand: KII FIOS FIRST ENTRY

## (undated) DEVICE — Device

## (undated) DEVICE — TROCAR: Brand: KII® SLEEVE

## (undated) DEVICE — SUT PDS II 1 CT-1 Z347H

## (undated) DEVICE — PENCIL TELESCOPE MEGADYNE SE

## (undated) DEVICE — TIGERTAIL 5F FLXTIP 70CM

## (undated) DEVICE — SOL NACL IRRIG 0.9% 1000ML BTL

## (undated) DEVICE — SUT VICRYL 0 UR-6 J603H

## (undated) DEVICE — DISPOSABLE LAPAROSCOPIC CLIP APPLIER WITH 20 CLIPS.: Brand: EPIX® UNIVERSAL CLIP APPLIER

## (undated) DEVICE — SUT VICRYL 3-0 SH J416H

## (undated) DEVICE — ENDOPATH ULTRA VERESS INSUFFLATION NEEDLES WITH LUER LOCK CONNECTORS: Brand: ENDOPATH

## (undated) DEVICE — TROCARS: Brand: KII® BALLOON BLUNT TIP SYSTEM

## (undated) DEVICE — LARGE, DISPOSABLE ALEXIS O C-SECTION PROTECTOR - RETRACTOR: Brand: ALEXIS ® O C-SECTION PROTECTOR - RETRACTOR

## (undated) DEVICE — ENDOPATH 5MM CURVED SCISSORS WITH MONOPOLAR CAUTERY: Brand: ENDOPATH

## (undated) DEVICE — 40580 - THE PINK PAD - ADVANCED TRENDELENBURG POSITIONING KIT: Brand: 40580 - THE PINK PAD - ADVANCED TRENDELENBURG POSITIONING KIT

## (undated) DEVICE — STERILE POLYISOPRENE POWDER-FREE SURGICAL GLOVES WITH EMOLLIENT COATING: Brand: PROTEXIS

## (undated) DEVICE — SLEEVE KENDALL SCD EXPRESS MED

## (undated) DEVICE — ANCHOR TISSUE RETRIEVAL SYSTEM, BAG SIZE 175 ML, PORT SIZE 10 MM: Brand: ANCHOR TISSUE RETRIEVAL SYSTEM

## (undated) DEVICE — STERILE POLYISOPRENE POWDER-FREE SURGICAL GLOVES: Brand: PROTEXIS

## (undated) DEVICE — ZIPWIRE GUIDEWIRE 035X150 STR

## (undated) DEVICE — APPLICATOR CHLORAPREP 26ML

## (undated) DEVICE — CLOSURE EXOFIN 1.0ML

## (undated) DEVICE — SUT MONOCRYL 4-0 PS-2 Y496G

## (undated) DEVICE — LIGHT HANDLE

## (undated) DEVICE — LAP CHOLE/APPY CDS-LF: Brand: MEDLINE INDUSTRIES, INC.

## (undated) DEVICE — C-ARM: Brand: UNBRANDED

## (undated) DEVICE — #15 STERILE STAINLESS BLADE: Brand: STERILE STAINLESS BLADES

## (undated) NOTE — Clinical Note
Continue weekly [place]/ 17-OHPC therapy until 36 weeksFetal echocardiogram at ~24 weeksThird trimester ultrasoundPlan repeat  at 40 weeks if she had a classical ; otherwise at 44 weeksShe will continue on the gestational diabetes diet and

## (undated) NOTE — Clinical Note
Continue care with Dr. Cowan weekly NSTPlan repeat C-sectionShe will continue on the gestational diabetes diet and blood sugar monitoring 4 times daily.     She will continue to send her blood sugar log to maternal-fetal medicine every weekInsulin as

## (undated) NOTE — Clinical Note
Continue care with Dr. Michelle Fuller weekly [place]/ 17-OHPC therapy at 16 weeks and continue until 39 weeksCervical length US at 16 weeks & at 20 weeksLevel II ultrasound at ~20 weeksThird trimester ultrasoundPlan repeat  at 37 weeks if she had a cl

## (undated) NOTE — Clinical Note
Continue weekly [place]/ 17-OHPC therapy until 36 weeksGrowth US in 3 weeksWeekly NST and increase to twice weekly at 29 weeksPlan repeat  at 40 weeks if she had a classical ; otherwise at 38 weeks for insulin requiring diabetesShe will co

## (undated) NOTE — LETTER
[facility]  [id number] [facility], [address] [place] [address]    Consent for Operation    Date: __________________    Time: _______________    1.  I authorize the performance upon Xi Rosa the following operation:                              R procedure has been videotaped, the surgeon will obtain the original videotape. The hospital will not be responsible for storage or maintenance of this tape.     6. For the purpose of advancing medical education, I consent to the admittance of observers to t STATEMENTS REQUIRING INSERTION OR COMPLETION WERE FILLED IN.     Signature of Patient:   ___________________________    When the patient is a minor or mentally incompetent to give consent:  Signature of person authorized to consent for patient: ____________ · If I am allergic to anything or have had a reaction to anesthesia before. 3. I understand how the anesthesia medicine will help me (benefits). 4. I understand that with any type of anesthesia medicine there are risks:  a.  The most common risks are: their representative has agreed to have anesthesia services.     _____________________________________________________________________________  Witness        Date   Time  I have verified that the signature is that of the patient or patient’s representative